# Patient Record
Sex: MALE | Race: WHITE | Employment: FULL TIME | URBAN - METROPOLITAN AREA
[De-identification: names, ages, dates, MRNs, and addresses within clinical notes are randomized per-mention and may not be internally consistent; named-entity substitution may affect disease eponyms.]

---

## 2017-09-11 ENCOUNTER — HOSPITAL ENCOUNTER (OUTPATIENT)
Dept: LAB | Age: 57
Discharge: HOME | End: 2017-09-11

## 2017-09-11 ENCOUNTER — HOSPITAL ENCOUNTER (EMERGENCY)
Age: 57
Discharge: HOME | End: 2017-09-12
Attending: EMERGENCY MEDICINE

## 2017-09-11 VITALS
SYSTOLIC BLOOD PRESSURE: 141 MMHG | DIASTOLIC BLOOD PRESSURE: 73 MMHG | OXYGEN SATURATION: 100 % | RESPIRATION RATE: 20 BRPM | HEART RATE: 85 BPM | BODY MASS INDEX: 30.78 KG/M2 | WEIGHT: 215 LBS | HEIGHT: 70 IN | TEMPERATURE: 97.7 F

## 2017-09-11 DIAGNOSIS — S39.012A LUMBAR STRAIN, INITIAL ENCOUNTER: Primary | ICD-10-CM

## 2017-09-11 DIAGNOSIS — M54.10 RADICULOPATHY OF LEG: ICD-10-CM

## 2017-09-11 LAB
ANION GAP BLD CALC-SCNC: 17 MMOL/L (ref 10–20)
BUN BLD-MCNC: 32 MG/DL (ref 7–18)
CA-I BLD-MCNC: 1.15 MMOL/L (ref 1.12–1.32)
CHLORIDE BLD-SCNC: 101 MMOL/L (ref 100–108)
CO2 BLD-SCNC: 25 MMOL/L (ref 19–24)
CREAT UR-MCNC: 1.2 MG/DL (ref 0.6–1.3)
GLUCOSE BLD STRIP.AUTO-MCNC: 249 MG/DL (ref 74–106)
HCT VFR BLD CALC: 42 % (ref 36–49)
HGB BLD-MCNC: 14.3 G/DL (ref 12–16)
POTASSIUM BLD-SCNC: 4.2 MMOL/L (ref 3.5–5.5)
SODIUM BLD-SCNC: 137 MMOL/L (ref 136–145)

## 2017-09-11 PROCEDURE — 99001 SPECIMEN HANDLING PT-LAB: CPT

## 2017-09-11 PROCEDURE — 80047 BASIC METABLC PNL IONIZED CA: CPT

## 2017-09-11 PROCEDURE — 99283 EMERGENCY DEPT VISIT LOW MDM: CPT

## 2017-09-11 RX ORDER — SIMVASTATIN 20 MG/1
TABLET, FILM COATED ORAL
COMMUNITY

## 2017-09-11 RX ORDER — IBUPROFEN 600 MG/1
600 TABLET ORAL
Qty: 20 TAB | Refills: 0 | Status: SHIPPED | OUTPATIENT
Start: 2017-09-11 | End: 2021-03-18

## 2017-09-11 RX ORDER — CYCLOBENZAPRINE HCL 5 MG
5 TABLET ORAL
Qty: 15 TAB | Refills: 0 | Status: SHIPPED | OUTPATIENT
Start: 2017-09-11

## 2017-09-11 RX ORDER — LEVOTHYROXINE SODIUM 50 UG/1
TABLET ORAL
COMMUNITY

## 2017-09-11 RX ORDER — INSULIN LISPRO 100 [IU]/ML
INJECTION, SOLUTION INTRAVENOUS; SUBCUTANEOUS
Status: ON HOLD | COMMUNITY
End: 2021-03-15

## 2017-09-11 RX ORDER — LISINOPRIL 5 MG/1
5 TABLET ORAL DAILY
COMMUNITY

## 2017-09-11 NOTE — LETTER
[facility] [place] 
THE [facility] EMERGENCY DEPT 
Keerthi Amor [address] 
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 Lumbar strain, initial encounter S39.012A 847.2 2. Radiculopathy of leg M54.10 724.4 Instructions:  Employee must return copy of this report to Personnel and/or Supervisor. Patient Identification - Company Protocol:   
   Checked & Followed   Not Available PART I:  Check Treatment X-ray     Lab    Discharge Instructions Given    Rx Given Non-Prescription Meds    Sutures       EKG Other (Comment):   
 
Part II:  Disposition May Return to Regular Work Without Restrictions May Return to Restricted Duty as Below Explanation of RESTRICTIONS (Comment): May NOT Return to Work until cleared by Referral Specialist or Occupational Medicine MD 
 
Part III:  Follow-Up Follow-up Information Follow up With Details Comments Contact Info Destiny Herrera MD Call in 2 days For primary care physician follow up   [address] [facility] [address] [phone] [facility] EMERGENCY DEPT Go to As needed, If symptoms worsen [address] [place] [address] [phone] Part IV: Was Workers Comp Supervisor Notified     Yes     No 
 
Damian Ventura was seen in the Emergency Department on 9/11/2017 by the following provider(s): 
Attending Provider: Saji Servin MD 
Physician Assistant: ANETA Carey; ED Nurse: PLEASE CALL CASE FACILITATOR, OCCUPATIONAL MEDICINE  
[phone] TO SCHEDULE AN APPOINTMENT Referral Physicians: Maria Esther Khan MD 
[id number] [facility] [address] [facility]. [place], [address] [address] [facility], [address] Phone:  [phone]; Fax:  [phone] ORTHOPEDICS [address] [facility] [address] [place] [address]., Suite [facility]., [address] [place], [place] [address]    [address] 
[phone] [facility] [address] [address] [place] [facility] [address] 
[phone]

## 2017-09-11 NOTE — LETTER
[facility] [place] 
THE [facility] EMERGENCY DEPT 
Keerthi Amor [id number] [phone] Anurag Arredondo Date: 9/12/2017 Sex: male [address] Matos [address] 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 Lumbar strain, initial encounter S39.012A 847.2 2. Radiculopathy of leg M54.10 724.4 Instructions:  Employee must return copy of this report to Personnel and/or Supervisor. Patient Identification - Company Protocol:   
   Checked & Followed   Not Available PART I:  Check Treatment X-ray     Lab    Discharge Instructions Given    Rx Given Non-Prescription Meds    Sutures       EKG Other (Comment):   
 
Part II:  Disposition May Return to Regular Work Without Restrictions May Return to Restricted Duty as Below Explanation of RESTRICTIONS (Comment): May NOT Return to Work until cleared by Referral Specialist or Occupational Medicine MD 
 
Part III:  Follow-Up Follow-up Information Follow up With Details Comments Contact Info Chris Fernández MD Call in 2 days For primary care physician follow up  [address] [place] [address] 
[phone] [facility] EMERGENCY DEPT Go to As needed, If symptoms worsen [address] [place] [address] 
[phone] Occupational Medicine [facility] family Practice Call in 1 day For occupational medicine follow up [address] [place] [address] 
[phone] Part IV: Was Workers Comp Supervisor Notified     Yes     No 
 
Anurag Arredondo was seen in the Emergency Department on 9/11/2017 by the following provider(s): 
Attending Provider: Al Scott MD 
Physician Assistant: ANETA English; ED Nurse: PLEASE CALL CASE FACILITATOR, OCCUPATIONAL MEDICINE  
 [phone] TO SCHEDULE AN APPOINTMENT Referral Physicians: Mary Hooks MD 
[id number] [facility]. [address] [facility]. [place], [address] [address] [facility], [address] Phone:  [phone]; Fax:  [phone] ORTHOPEDICS [address] [facility] [address] [place] [address]., Suite [facility]., [address] [place], [place] [address]em[address] Leone [address]    [address] 
[phone] [facility] [address] [address] 
[phone]

## 2017-09-11 NOTE — LETTER
[facility] [place] 
THE [facility] EMERGENCY DEPT 
[address] [id number] [phone] Work/School Note Date: 9/11/2017 To Whom It May concern: 
 
Damian Ventura was seen and treated today in the emergency room by the following provider(s): 
Attending Provider: Saji Servin MD 
Physician Assistant: ANTEA Carey. Damian Ventura may return to work after cleared by Occupational Medicine follow up. Sincerely, Jing Cobb PA-C

## 2017-09-12 NOTE — ED TRIAGE NOTES
Patient states that he injured his back while at work last night Patient with complaints of pain to the left side that radiates to the left thigh.

## 2017-09-12 NOTE — DISCHARGE INSTRUCTIONS
Back Pain: Care Instructions  Your Care Instructions    Back pain has many possible causes. It is often related to problems with muscles and ligaments of the back. It may also be related to problems with the nerves, discs, or bones of the back. Moving, lifting, standing, sitting, or sleeping in an awkward way can strain the back. Sometimes you don't notice the injury until later. Arthritis is another common cause of back pain. Although it may hurt a lot, back pain usually improves on its own within several weeks. Most people recover in 12 weeks or less. Using good home treatment and being careful not to stress your back can help you feel better sooner. Follow-up care is a key part of your treatment and safety. Be sure to make and go to all appointments, and call your doctor if you are having problems. Its also a good idea to know your test results and keep a list of the medicines you take. How can you care for yourself at home? · Sit or lie in positions that are most comfortable and reduce your pain. Try one of these positions when you lie down:  ¨ Lie on your back with your knees bent and supported by large pillows. ¨ Lie on the floor with your legs on the seat of a sofa or chair. Karn Miser on your side with your knees and hips bent and a pillow between your legs. ¨ Lie on your stomach if it does not make pain worse. · Do not sit up in bed, and avoid soft couches and twisted positions. Bed rest can help relieve pain at first, but it delays healing. Avoid bed rest after the first day of back pain. · Change positions every 30 minutes. If you must sit for long periods of time, take breaks from sitting. Get up and walk around, or lie in a comfortable position. · Try using a heating pad on a low or medium setting for 15 to 20 minutes every 2 or 3 hours. Try a warm shower in place of one session with the heating pad. · You can also try an ice pack for 10 to 15 minutes every 2 to 3 hours.  Put a thin cloth between the ice pack and your skin. · Take pain medicines exactly as directed. ¨ If the doctor gave you a prescription medicine for pain, take it as prescribed. ¨ If you are not taking a prescription pain medicine, ask your doctor if you can take an over-the-counter medicine. · Take short walks several times a day. You can start with 5 to 10 minutes, 3 or 4 times a day, and work up to longer walks. Walk on level surfaces and avoid hills and stairs until your back is better. · Return to work and other activities as soon as you can. Continued rest without activity is usually not good for your back. · To prevent future back pain, do exercises to stretch and strengthen your back and stomach. Learn how to use good posture, safe lifting techniques, and proper body mechanics. When should you call for help? Call your doctor now or seek immediate medical care if:  · You have new or worsening numbness in your legs. · You have new or worsening weakness in your legs. (This could make it hard to stand up.)  · You lose control of your bladder or bowels. Watch closely for changes in your health, and be sure to contact your doctor if:  · Your pain gets worse. · You are not getting better after 2 weeks. Where can you learn more? Go to http://eden-marcela.info/. Enter Q536 in the search box to learn more about \"Back Pain: Care Instructions. \"  Current as of: March 21, 2017  Content Version: 11.3  © 7067-2219 [employer]. Care instructions adapted under license by [employer] (which disclaims liability or warranty for this information). If you have questions about a medical condition or this instruction, always ask your healthcare professional. [address] any warranty or liability for your use of this information. Back Strain: Care Instructions  Your Care Instructions    Back strain happens when you overstretch, or pull, a muscle in your back.  You may hurt your back in an accident or when you exercise or lift something. Most back pain will get better with rest and time. You can take care of yourself at home to help your back heal.  Follow-up care is a key part of your treatment and safety. Be sure to make and go to all appointments, and call your doctor if you are having problems. It's also a good idea to know your test results and keep a list of the medicines you take. How can you care for yourself at home? · Try to stay as active as you can, but stop or reduce any activity that causes pain. · Put ice or a cold pack on the sore muscle for 10 to 20 minutes at a time to stop swelling. Try this every 1 to 2 hours for 3 days (when you are awake) or until the swelling goes down. Put a thin cloth between the ice pack and your skin. · After 2 or 3 days, apply a heating pad on low or a warm cloth to your back. Some doctors suggest that you go back and forth between hot and cold treatments. · Take pain medicines exactly as directed. ¨ If the doctor gave you a prescription medicine for pain, take it as prescribed. ¨ If you are not taking a prescription pain medicine, ask your doctor if you can take an over-the-counter medicine. · Try sleeping on your side with a pillow between your legs. Or put a pillow under your knees when you lie on your back. These measures can ease pain in your lower back. · Return to your usual level of activity slowly. When should you call for help? Call 911 anytime you think you may need emergency care. For example, call if:  · You are unable to move a leg at all. Call your doctor now or seek immediate medical care if:  · You have new or worse symptoms in your legs, belly, or buttocks. Symptoms may include:  ¨ Numbness or tingling. ¨ Weakness. ¨ Pain. · You lose bladder or bowel control. Watch closely for changes in your health, and be sure to contact your doctor if you are not getting better as expected.   Where can you learn more?  Go to http://eden-marcela.info/. Enter P737 in the search box to learn more about \"Back Strain: Care Instructions. \"  Current as of: March 21, 2017  Content Version: 11.3  © 0940-9590 [employer]. Care instructions adapted under license by [employer] (which disclaims liability or warranty for this information). If you have questions about a medical condition or this instruction, always ask your healthcare professional. [address] any warranty or liability for your use of this information.

## 2017-09-12 NOTE — ED NOTES
I have reviewed discharge instructions with the patient. The patient verbalized understanding. Patient armband removed and shredded. patient given 2 prescriptions. Discussed side effects with patient. patient verbalized understanding. Patient wheeled to lobby by this RN with female vistor. Patient discharged in stable condition to care of self. Given 1 work note and workmen's comp paperwork.

## 2017-09-12 NOTE — ED PROVIDER NOTES
HPI Comments:   10:59 PM    Pearl Boas is a 65 y.o. male PMHx DM presents to ED C/O constant left sided back pain, onset yesterday night. 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 Associated symptoms include pain shooting down left leg when he moves, and states some mild leg weakness. Pt has not tried anything for the pain. Pt reports good kidney function. Pt denies tingling in legs, incontinence, Hx of back injuries and any other Sx or complaints. The history is provided by the patient. No  was used. Past Medical History:   Diagnosis Date    Diabetes ([facility] [address]     Hashimoto's disease     High cholesterol     Hypertension        Past Surgical History:   Procedure Laterality Date    HX KNEE ARTHROSCOPY           History reviewed. No pertinent family history. Social History     Social History    Marital status: SINGLE     Spouse name: N/A    Number of children: N/A    Years of education: N/A     Occupational History    Not on file. Social History Main Topics    Smoking status: Never Smoker    Smokeless tobacco: Never Used    Alcohol use No    Drug use: Not on file    Sexual activity: Not on file     Other Topics Concern    Not on file     Social History Narrative    No narrative on file         ALLERGIES: Glipizide and Metformin    Review of Systems   Constitutional: Negative for chills and fever. Musculoskeletal: Positive for back pain. (+) LLE pain     Neurological: Positive for weakness (mild weakness in legs). All other systems reviewed and are negative. Vitals:    09/11/17 2228   BP: 141/73   Pulse: 85   Resp: 20   Temp: 97.7 °F (36.5 °C)   SpO2: 100%   Weight: 97.5 kg (215 lb)   Height: 5' 10\" (1.778 m)            Physical Exam   Constitutional: He is oriented to person, place, and time. He appears well-developed and well-nourished.    Able to ambulate in ED, non toxic, NAD    HENT:   Head: Normocephalic and atraumatic. Neck: Normal range of motion. Neck supple. Cardiovascular: Normal rate, regular rhythm, normal heart sounds and intact distal pulses. No murmur heard. Pulmonary/Chest: Effort normal and breath sounds normal. No respiratory distress. He has no wheezes. He has no rales. Musculoskeletal:        Lumbar back: He exhibits tenderness. He exhibits normal range of motion, no bony tenderness, no swelling, no edema, no deformity and no spasm. Back:    BLE, strength 5/5, N/V intact, brisk cap refill, pulses 2+ for DP and PT     Neurological: He is alert and oriented to person, place, and time. He has normal strength. No sensory deficit. Reflex Scores:       Patellar reflexes are 2+ on the right side and 2+ on the left side. Skin: Skin is warm and dry. Psychiatric: He has a normal mood and affect. Judgment normal.   Nursing note and vitals reviewed.     RESULTS:    PULSE OXIMETRY NOTE:  Pulse-ox is 100% on room air  Interpretation: normal       No orders to display        Labs Reviewed   POC CHEM8 - Abnormal; Notable for the following:        Result Value    CO2, POC 25 (*)     Glucose,  (*)     BUN, POC 32 (*)     All other components within normal limits   POC CHEM8       Recent Results (from the past 12 hour(s))   POC CHEM8    Collection Time: 09/11/17 11:36 PM   Result Value Ref Range    CO2, POC 25 (H) 19 - 24 MMOL/L    Glucose,  (H) 74 - 106 MG/DL    BUN, POC 32 (H) 7 - 18 MG/DL    Creatinine, POC 1.2 0.6 - 1.3 MG/DL    GFRAA, POC >60 >60 ml/min/1.73m2    GFRNA, POC >60 >60 ml/min/1.73m2    Sodium,  136 - 145 MMOL/L    Potassium, POC 4.2 3.5 - 5.5 MMOL/L    Calcium, ionized (POC) 1.15 1.12 - 1.32 MMOL/L    Chloride,  100 - 108 MMOL/L    Anion gap, POC 17 10 - 20      Hematocrit, POC 42 36 - 49 %    Hemoglobin, POC 14.3 12 - 16 G/DL         MDM  Number of Diagnoses or Management Options  Lumbar strain, initial encounter:   Radiculopathy of leg:   Diagnosis management comments: Back pain is consistent with lumbar strain. Could be disc involvement / herniation. Doubt fracture, cauda equina syndrome, AAA, metastases, pathological fracture, Zoster or other serious pathology. No neurological deficits. Amount and/or Complexity of Data Reviewed  Clinical lab tests: ordered and reviewed      ED Course     Medications - No data to display     Procedures    PROGRESS NOTE:  10:59 PM  Initial assessment performed. Written by Leny Lujan, ED Scribe, as dictated by Santhosh Dunham PA-C     PROGRESS NOTE:   11:46 PM  Pt has been re-examined by Santhosh Dunham PA-C. Pt creatinine was 1.2 and BUN was 32. Will give low dose ibuprofen for inflammation and flexeril     DISCHARGE NOTE:  11:50 PM  Edith Miller's  results have been reviewed with him. He has been counseled regarding his diagnosis, treatment, and plan. He verbally conveys understanding and agreement of the signs, symptoms, diagnosis, treatment and prognosis and additionally agrees to follow up as discussed. He also agrees with the care-plan and conveys that all of his questions have been answered. I have also provided discharge instructions for him that include: educational information regarding their diagnosis and treatment, and list of reasons why they would want to return to the ED prior to their follow-up appointment, should his condition change. Proper ED utilization discussed with the pt. CLINICAL IMPRESSION:    1. Lumbar strain, initial encounter    2.  Radiculopathy of leg        PLAN: DISCHARGE HOME    Follow-up Information     Follow up With Details Comments Laz Bourgeois MD Call in 2 days For primary care physician follow up  [address] [place] [address],[address]      THE [facility] EMERGENCY DEPT Go to As needed, If symptoms worsen [address] [address] [phone] Occupational Medicine & family Practice Call in 1 day For occupational medicine follow up [address] [facility], [address]  [phone]          Discharge Medication List as of 9/12/2017 12:03 AM          ATTESTATIONS:  This note is prepared by Poppy Aguilar, acting as Scribe for Daisy Ruiz PA-C. Daisy Ruiz PA-C: The scribe's documentation has been prepared under my direction and personally reviewed by me in its entirety. I confirm that the note above accurately reflects all work, treatment, procedures, and medical decision making performed by me.

## 2021-03-07 ENCOUNTER — HOSPITAL ENCOUNTER (EMERGENCY)
Age: 61
Discharge: HOME | End: 2021-03-08
Attending: EMERGENCY MEDICINE
Payer: COMMERCIAL

## 2021-03-07 DIAGNOSIS — Z20.822 PERSON UNDER INVESTIGATION FOR COVID-19: Primary | ICD-10-CM

## 2021-03-07 LAB
LACTATE BLD-SCNC: 0.96 MMOL/L (ref 0.4–2)
SARS-COV-2, COV2: NORMAL

## 2021-03-07 PROCEDURE — 87804 INFLUENZA ASSAY W/OPTIC: CPT

## 2021-03-07 PROCEDURE — 83605 ASSAY OF LACTIC ACID: CPT

## 2021-03-07 PROCEDURE — 80053 COMPREHEN METABOLIC PANEL: CPT

## 2021-03-07 PROCEDURE — 85025 COMPLETE CBC W/AUTO DIFF WBC: CPT

## 2021-03-07 PROCEDURE — 99284 EMERGENCY DEPT VISIT MOD MDM: CPT

## 2021-03-07 PROCEDURE — U0003 INFECTIOUS AGENT DETECTION BY NUCLEIC ACID (DNA OR RNA); SEVERE ACUTE RESPIRATORY SYNDROME CORONAVIRUS 2 (SARS-COV-2) (CORONAVIRUS DISEASE [COVID-19]), AMPLIFIED PROBE TECHNIQUE, MAKING USE OF HIGH THROUGHPUT TECHNOLOGIES AS DESCRIBED BY CMS-2020-01-R: HCPCS

## 2021-03-07 RX ORDER — IBUPROFEN 400 MG/1
800 TABLET ORAL
Status: COMPLETED | OUTPATIENT
Start: 2021-03-07 | End: 2021-03-08

## 2021-03-07 NOTE — Clinical Note
[facility] [place] 
THE [facility] EMERGENCY DEPT 
[address] [address] [phone] Work/School Note Date: 3/7/2021 To Whom It May concern: 
 
Masood Colno was evaulated by the following provider(s): 
Attending Provider: Gab Olson MD 
Physician Assistant: Archie Mireles, [address] virus is suspected. Per the CDC guidelines we recommend home isolation until the following conditions are all met: 1. At least 10 days have passed since symptoms first appeared and 2. At least 24 hours have passed since last fever without the use of fever-reducing medications and 
3. Symptoms (e.g., cough, shortness of breath) have improved Sincerely, ANETA Monterroso

## 2021-03-08 ENCOUNTER — APPOINTMENT (OUTPATIENT)
Dept: GENERAL RADIOLOGY | Age: 61
End: 2021-03-08
Attending: PHYSICIAN ASSISTANT
Payer: COMMERCIAL

## 2021-03-08 VITALS
HEIGHT: 69 IN | WEIGHT: 212 LBS | SYSTOLIC BLOOD PRESSURE: 126 MMHG | TEMPERATURE: 99.6 F | DIASTOLIC BLOOD PRESSURE: 69 MMHG | RESPIRATION RATE: 27 BRPM | BODY MASS INDEX: 31.4 KG/M2 | OXYGEN SATURATION: 94 % | HEART RATE: 75 BPM

## 2021-03-08 LAB
ALBUMIN SERPL-MCNC: 3.8 G/DL (ref 3.4–5)
ALBUMIN/GLOB SERPL: 1.2 {RATIO} (ref 0.8–1.7)
ALP SERPL-CCNC: 135 U/L (ref 45–117)
ALT SERPL-CCNC: 27 U/L (ref 16–61)
ANION GAP SERPL CALC-SCNC: 7 MMOL/L (ref 3–18)
AST SERPL-CCNC: 19 U/L (ref 10–38)
BASOPHILS # BLD: 0 K/UL (ref 0–0.1)
BASOPHILS NFR BLD: 0 % (ref 0–2)
BILIRUB SERPL-MCNC: 0.4 MG/DL (ref 0.2–1)
BUN SERPL-MCNC: 27 MG/DL (ref 7–18)
BUN/CREAT SERPL: 20 (ref 12–20)
CALCIUM SERPL-MCNC: 9.1 MG/DL (ref 8.5–10.1)
CHLORIDE SERPL-SCNC: 101 MMOL/L (ref 100–111)
CO2 SERPL-SCNC: 25 MMOL/L (ref 21–32)
CREAT SERPL-MCNC: 1.35 MG/DL (ref 0.6–1.3)
DIFFERENTIAL METHOD BLD: ABNORMAL
EOSINOPHIL # BLD: 0 K/UL (ref 0–0.4)
EOSINOPHIL NFR BLD: 1 % (ref 0–5)
ERYTHROCYTE [DISTWIDTH] IN BLOOD BY AUTOMATED COUNT: 11.2 % (ref 11.6–14.5)
FLUAV AG NPH QL IA: NEGATIVE
FLUBV AG NOSE QL IA: NEGATIVE
GLOBULIN SER CALC-MCNC: 3.2 G/DL (ref 2–4)
GLUCOSE SERPL-MCNC: 317 MG/DL (ref 74–99)
HCT VFR BLD AUTO: 40 % (ref 36–48)
HGB BLD-MCNC: 14.1 G/DL (ref 13–16)
LYMPHOCYTES # BLD: 0.6 K/UL (ref 0.9–3.6)
LYMPHOCYTES NFR BLD: 8 % (ref 21–52)
MCH RBC QN AUTO: 33 PG (ref 24–34)
MCHC RBC AUTO-ENTMCNC: 35.3 G/DL (ref 31–37)
MCV RBC AUTO: 93.7 FL (ref 74–97)
MONOCYTES # BLD: 0.4 K/UL (ref 0.05–1.2)
MONOCYTES NFR BLD: 6 % (ref 3–10)
NEUTS SEG # BLD: 5.6 K/UL (ref 1.8–8)
NEUTS SEG NFR BLD: 85 % (ref 40–73)
PLATELET # BLD AUTO: 193 K/UL (ref 135–420)
PMV BLD AUTO: 10.5 FL (ref 9.2–11.8)
POTASSIUM SERPL-SCNC: 4.1 MMOL/L (ref 3.5–5.5)
PROT SERPL-MCNC: 7 G/DL (ref 6.4–8.2)
RBC # BLD AUTO: 4.27 M/UL (ref 4.7–5.5)
SODIUM SERPL-SCNC: 133 MMOL/L (ref 136–145)
WBC # BLD AUTO: 6.7 K/UL (ref 4.6–13.2)

## 2021-03-08 PROCEDURE — 71045 X-RAY EXAM CHEST 1 VIEW: CPT

## 2021-03-08 PROCEDURE — 74011250637 HC RX REV CODE- 250/637: Performed by: PHYSICIAN ASSISTANT

## 2021-03-08 PROCEDURE — 74011250636 HC RX REV CODE- 250/636: Performed by: PHYSICIAN ASSISTANT

## 2021-03-08 RX ORDER — DOXYCYCLINE HYCLATE 100 MG
100 TABLET ORAL 2 TIMES DAILY
Qty: 14 TAB | Refills: 0 | Status: SHIPPED | OUTPATIENT
Start: 2021-03-08 | End: 2021-03-18

## 2021-03-08 RX ORDER — ALBUTEROL SULFATE 90 UG/1
2 AEROSOL, METERED RESPIRATORY (INHALATION)
Qty: 1 INHALER | Refills: 0 | Status: SHIPPED | OUTPATIENT
Start: 2021-03-08

## 2021-03-08 RX ADMIN — SODIUM CHLORIDE 1000 ML: 900 INJECTION, SOLUTION INTRAVENOUS at 00:46

## 2021-03-08 RX ADMIN — IBUPROFEN 800 MG: 400 TABLET, FILM COATED ORAL at 00:09

## 2021-03-08 NOTE — ED TRIAGE NOTES
Patient arrives ambulatory from home c/o sore throat and cold symptoms x2 days. Sore throat is gone but not reports headache, body aches, and weakness. Hx of diabetes. Unknown fevers. Works at [employer] so unknown [address].

## 2021-03-08 NOTE — ED PROVIDER NOTES
EMERGENCY DEPARTMENT HISTORY AND PHYSICAL EXAM    Date: 3/7/2021  Patient Name: Chrissie Rogers    History of Presenting Illness     Chief Complaint   Patient presents with    Concern For DWPEO-59 (Coronavirus)         History Provided By: Patient    Chief Complaint: flu like sxs       Additional History (Context):   11:26 PM  Chrissie Rogers is a 62 y.o. male with PMHX diabetes presents to the emergency department C/O congestion sore throat that began last week which is now resolved now with a slightly productive cough. No chest pain or shortness of breath. States has been feeling chills and body aches with a slight headache. States he was unaware of fever at home until he arrived in the ED. He denies any known exposure to Covid but states he works at [employer] and could have been exposed. PCP: Herbert Gill MD    Current Facility-Administered Medications   Medication Dose Route Frequency Provider Last Rate Last Admin    sodium chloride 0.9 % bolus infusion 1,000 mL  1,000 mL IntraVENous ONCE ANETA [place] 1,000 mL/hr at 03/08/21 0046 1,000 mL at 03/08/21 0046     Current Outpatient Medications   Medication Sig Dispense Refill    albuterol (PROVENTIL HFA, VENTOLIN HFA, PROAIR HFA) 90 mcg/actuation inhaler Take 2 Puffs by inhalation every four (4) hours as needed for Wheezing. 1 Inhaler 0    doxycycline (VIBRA-TABS) 100 mg tablet Take 1 Tab by mouth two (2) times a day for 7 days. 14 Tab 0    insulin detemir (LEVEMIR) 100 unit/mL injection by SubCUTAneous route nightly.  insulin lispro (HUMALOG) 100 unit/mL injection by SubCUTAneous route.  levothyroxine (SYNTHROID) 50 mcg tablet Take  by mouth Daily (before breakfast).  lisinopril (PRINIVIL, ZESTRIL) 5 mg tablet Take 5 mg by mouth daily.  simvastatin (ZOCOR) 20 mg tablet Take  by mouth nightly.  ibuprofen (MOTRIN) 600 mg tablet Take 1 Tab by mouth every six (6) hours as needed for Pain.  20 Tab 0    cyclobenzaprine (FLEXERIL) 5 mg tablet Take 1 Tab by mouth three (3) times daily as needed for Muscle Spasm(s). 15 Tab 0       Past History     Past Medical History:  Past Medical History:   Diagnosis Date    Diabetes ([address]     Hashimoto's disease     High cholesterol     Hypertension        Past Surgical History:  Past Surgical History:   Procedure Laterality Date    HX KNEE ARTHROSCOPY         Family History:  History reviewed. No pertinent family history. Social History:  Social History     Tobacco Use    Smoking status: Never Smoker    Smokeless tobacco: Never Used   Substance Use Topics    Alcohol use: No    Drug use: Not on file       Allergies: Allergies   Allergen Reactions    Glipizide Other (comments)     Kidney problems      Metformin Other (comments)     Kidney problems           Review of Systems   Review of Systems   Constitutional: Negative for chills and fever. HENT: Positive for congestion and sore throat. Respiratory: Positive for cough. Negative for shortness of breath. Cardiovascular: Negative for chest pain. Gastrointestinal: Negative for abdominal pain, diarrhea, nausea and vomiting. Musculoskeletal: Positive for myalgias. Neurological: Positive for headaches. Negative for weakness and numbness. All other systems reviewed and are negative. Physical Exam     Vitals:    03/07/21 2315 03/08/21 0000 03/08/21 0030 03/08/21 0045   BP: (!) 154/68 123/87 137/69 105/85   Pulse: 99  85 87   Resp: 16  (!) 32 25   Temp: (!) 101.6 °F (38.7 °C)   99.6 °F (37.6 °C)   SpO2: 98% 96% 95% 94%   Weight: 96.2 kg (212 lb)      Height: 5' 9\" (1.753 m)        Physical Exam  Vitals signs and nursing note reviewed. Constitutional:       General: He is not in acute distress. Appearance: He is well-developed. Comments: Alert, well appearing, non toxic, speaking in full sentences without difficulty    HENT:      Head: Normocephalic and atraumatic.       Right Ear: Tympanic membrane, ear canal and external ear normal. Tympanic membrane is not perforated, erythematous, retracted or bulging. Left Ear: Tympanic membrane, ear canal and external ear normal. Tympanic membrane is not perforated, erythematous, retracted or bulging. Nose: Nose normal. No mucosal edema or rhinorrhea. Right Sinus: No maxillary sinus tenderness or frontal sinus tenderness. Left Sinus: No maxillary sinus tenderness or frontal sinus tenderness. Mouth/Throat:      Mouth: Mucous membranes are not dry. Pharynx: Uvula midline. No oropharyngeal exudate, posterior oropharyngeal erythema or uvula swelling. Tonsils: No tonsillar abscesses. Neck:      Musculoskeletal: Normal range of motion and neck supple. Cardiovascular:      Rate and Rhythm: Normal rate and regular rhythm. Heart sounds: Normal heart sounds. No murmur. Pulmonary:      Effort: Pulmonary effort is normal. No respiratory distress. Breath sounds: Normal breath sounds. No wheezing or rales. Abdominal:      General: Bowel sounds are normal.      Palpations: Abdomen is soft. Tenderness: There is no abdominal tenderness. Lymphadenopathy:      Cervical: No cervical adenopathy. Skin:     General: Skin is warm and dry. Findings: No rash. Neurological:      Mental Status: He is alert and oriented to person, place, and time.    Psychiatric:         Judgment: Judgment normal.         Diagnostic Study Results     Labs:     Recent Results (from the past 12 hour(s))   INFLUENZA A & B AG (RAPID TEST)    Collection Time: 03/07/21 11:31 PM   Result Value Ref Range    Influenza A Antigen Negative NEG      Influenza B Antigen Negative NEG     SARS-COV-2    Collection Time: 03/07/21 11:31 PM   Result Value Ref Range    SARS-CoV-2 Please find results under separate order     POC LACTIC ACID    Collection Time: 03/07/21 11:32 PM   Result Value Ref Range    Lactic Acid (POC) 0.96 0.40 - 2.00 mmol/L   CBC WITH AUTOMATED DIFF    Collection Time: 03/07/21 11:32 PM   Result Value Ref Range    WBC 6.7 4.6 - 13.2 K/uL    RBC 4.27 (L) 4.70 - 5.50 M/uL    HGB 14.1 13.0 - 16.0 g/dL    HCT 40.0 36.0 - 48.0 %    MCV 93.7 74.0 - 97.0 FL    MCH 33.0 24.0 - 34.0 PG    MCHC 35.3 31.0 - 37.0 g/dL    RDW 11.2 (L) 11.6 - 14.5 %    PLATELET 250 702 - 609 K/uL    MPV 10.5 9.2 - 11.8 FL    NEUTROPHILS 85 (H) 40 - 73 %    LYMPHOCYTES 8 (L) 21 - 52 %    MONOCYTES 6 3 - 10 %    EOSINOPHILS 1 0 - 5 %    BASOPHILS 0 0 - 2 %    ABS. NEUTROPHILS 5.6 1.8 - 8.0 K/UL    ABS. LYMPHOCYTES 0.6 (L) 0.9 - 3.6 K/UL    ABS. MONOCYTES 0.4 0.05 - 1.2 K/UL    ABS. EOSINOPHILS 0.0 0.0 - 0.4 K/UL    ABS. BASOPHILS 0.0 0.0 - 0.1 K/UL    DF AUTOMATED     METABOLIC PANEL, COMPREHENSIVE    Collection Time: 03/07/21 11:32 PM   Result Value Ref Range    Sodium 133 (L) 136 - 145 mmol/L    Potassium 4.1 3.5 - 5.5 mmol/L    Chloride 101 100 - 111 mmol/L    CO2 25 21 - 32 mmol/L    Anion gap 7 3.0 - 18 mmol/L    Glucose 317 (H) 74 - 99 mg/dL    BUN 27 (H) 7.0 - 18 MG/DL    Creatinine 1.35 (H) 0.6 - 1.3 MG/DL    BUN/Creatinine ratio 20 12 - 20      GFR est AA >60 >60 ml/min/1.73m2    GFR est non-AA 54 (L) >60 ml/min/1.73m2    Calcium 9.1 8.5 - 10.1 MG/DL    Bilirubin, total 0.4 0.2 - 1.0 MG/DL    ALT (SGPT) 27 16 - 61 U/L    AST (SGOT) 19 10 - 38 U/L    Alk. phosphatase 135 (H) 45 - 117 U/L    Protein, total 7.0 6.4 - 8.2 g/dL    Albumin 3.8 3.4 - 5.0 g/dL    Globulin 3.2 2.0 - 4.0 g/dL    A-G Ratio 1.2 0.8 - 1.7         Radiologic Studies:   XR CHEST PORT    (Results Pending)     CT Results  (Last 48 hours)    None        CXR Results  (Last 48 hours)    None          Medical Decision Making   I am the first provider for this patient. I reviewed the vital signs, available nursing notes, past medical history, past surgical history, family history and social history. Vital Signs: Reviewed the patient's vital signs.     Pulse Oximetry Analysis: 94% on RA       Records Reviewed: Nursing Notes and Old Medical Records    Procedures:  Procedures    ED Course:   11:26 PM Initial assessment performed. The patients presenting problems have been discussed, and they are in agreement with the care plan formulated and outlined with them. I have encouraged them to ask questions as they arise throughout their visit. Discussion:  Pt presents with leg symptoms and a cough. Patient initially febrile. Concern for Covid. Rapid flu negative labs within normal limits with the exception of hyperglycemia which patient does have a history of diabetes. X-ray shows some changes that may represent early pneumonia or Covid, official radiology read pending. Will start on albuterol inhaler and cover with doxycycline given history of diabetes and have patient self isolate until Covid test results. Strict return precautions given, pt offering no questions or complaints. Diagnosis and Disposition     DISCHARGE NOTE:  Piero Peña  results have been reviewed with him. He has been counseled regarding his diagnosis, treatment, and plan. He verbally conveys understanding and agreement of the signs, symptoms, diagnosis, treatment and prognosis and additionally agrees to follow up as discussed. He also agrees with the care-plan and conveys that all of his questions have been answered. I have also provided discharge instructions for him that include: educational information regarding their diagnosis and treatment, and list of reasons why they would want to return to the ED prior to their follow-up appointment, should his condition change. He has been provided with education for proper emergency department utilization. CLINICAL IMPRESSION:    1. Person under investigation for COVID-19        PLAN:  1. D/C Home  2.    Current Discharge Medication List      START taking these medications    Details   albuterol (PROVENTIL HFA, VENTOLIN HFA, PROAIR HFA) 90 mcg/actuation inhaler Take 2 Puffs by inhalation every four (4) hours as needed for Wheezing. Qty: 1 Inhaler, Refills: 0      doxycycline (VIBRA-TABS) 100 mg tablet Take 1 Tab by mouth two (2) times a day for 7 days. Qty: 14 Tab, Refills: 0           3. Follow-up Information     Follow up With Specialties Details Why Contact Info    Nury Wolf MD Internal Medicine Schedule an appointment as soon as possible for a visit   [address] [place] [address],[address]      [facility] EMERGENCY DEPT Emergency Medicine  If symptoms worsen [address] [id number] [phone]                 Please note that this dictation was completed with [employer], the computer voice recognition software. Quite often unanticipated grammatical, syntax, homophones, and other interpretive errors are inadvertently transcribed by the computer software. Please disregard these errors. Please excuse any errors that have escaped final proofreading.

## 2021-03-09 ENCOUNTER — PATIENT OUTREACH (OUTPATIENT)
Dept: CASE MANAGEMENT | Age: 61
End: 2021-03-09

## 2021-03-09 LAB — SARS-COV-2, COV2NT: DETECTED

## 2021-03-09 NOTE — CALL BACK NOTE
Called mobile number on file. Reviewed positive Covid screening with patient. Understands diagnosis. Isolation quarantine and strict return precautions reviewed.

## 2021-03-09 NOTE — PROGRESS NOTES
Patient contacted regarding CMFXE-88 Suspect. Discussed COVID-19 related testing which was pending at this time. Test results were pending. Patient informed of results, if available? n/a. Outreach made within 2 business days of discharge: Yes    LPN Care Coordinator contacted the patient by telephone to perform post discharge assessment. Verified name and  with patient as identifiers. Provided introduction to self, and explanation of LPN Care Coordinator role, and reason for call due to risk factors for infection and/or exposure to COVID-19. Symptoms reviewed with patient who verbalized the following symptoms: no new symptoms, no worsening symptoms and Pt states he has lost of appetitie but no other symptoms . Denies SOB, chest pain, cough, chills, fever, body aches, wheezing, lightheadness/dizziness,HA, n/v/d at this time. Due to no new or worsening symptoms encounter was not routed to provider for escalation. Discussed follow-up appointments. If no appointment was previously scheduled, appointment scheduling offered: [facility] follow up appointment(s): No future appointments. Non-[facility] follow up appointment(s): n/a    Patient encouraged to make an appointment with PCP for f/up. Advised to return to ED if symptoms worsen or fail to improve. Patients acknowledges understanding. Advance Care Planning:   Does patient have an Advance Directive: currently not on file; education provided      Patient has following risk factors of: diabetes, HTN .     LPN reviewed discharge instructions, medical action plan and red flags such as increased shortness of breath, increasing fever and signs of decompensation with patient who verbalized understanding. Discussed exposure protocols and quarantine with CDC Guidelines What to do if you are sick with coronavirus disease .  Patient was given an opportunity for questions and concerns.  The patient agrees to contact the [facility] exposure line [phone], local [facility] [address]  ([phone]) and PCP office for questions related to their healthcare. LPN provided contact information for future needs. Reviewed and educated patient on any new and changed medications related to discharge diagnosis. Patient/family/caregiver given information for [employer] and agrees to enroll yes  Patient's preferred e-mail:  n/a  Patient's preferred phone number: [phone]   Based on Loop alert triggers, patient will be contacted by nurse care manager for worsening symptoms. Pt will be further monitored by COVID Loop Team based on severity of symptoms and risk factors.

## 2021-03-12 ENCOUNTER — HOSPITAL ENCOUNTER (INPATIENT)
Age: 61
LOS: 6 days | Discharge: HOME | DRG: 177 | End: 2021-03-18
Attending: EMERGENCY MEDICINE | Admitting: FAMILY MEDICINE
Payer: COMMERCIAL

## 2021-03-12 ENCOUNTER — APPOINTMENT (OUTPATIENT)
Dept: GENERAL RADIOLOGY | Age: 61
DRG: 177 | End: 2021-03-12
Attending: EMERGENCY MEDICINE
Payer: COMMERCIAL

## 2021-03-12 ENCOUNTER — PATIENT OUTREACH (OUTPATIENT)
Dept: CASE MANAGEMENT | Age: 61
End: 2021-03-12

## 2021-03-12 DIAGNOSIS — R09.02 HYPOXIA: ICD-10-CM

## 2021-03-12 DIAGNOSIS — U07.1 COVID-19: Primary | ICD-10-CM

## 2021-03-12 LAB
ALBUMIN SERPL-MCNC: 3.1 G/DL (ref 3.4–5)
ALBUMIN/GLOB SERPL: 0.9 {RATIO} (ref 0.8–1.7)
ALP SERPL-CCNC: 98 U/L (ref 45–117)
ALT SERPL-CCNC: 16 U/L (ref 16–61)
ANION GAP SERPL CALC-SCNC: 11 MMOL/L (ref 3–18)
APTT PPP: 43.4 SEC (ref 23–36.4)
ARTERIAL PATENCY WRIST A: ABNORMAL
AST SERPL-CCNC: 23 U/L (ref 10–38)
BASE DEFICIT BLD-SCNC: 1 MMOL/L
BASOPHILS # BLD: 0 K/UL (ref 0–0.1)
BASOPHILS NFR BLD: 0 % (ref 0–2)
BDY SITE: ABNORMAL
BILIRUB SERPL-MCNC: 0.5 MG/DL (ref 0.2–1)
BNP SERPL-MCNC: 85 PG/ML (ref 0–900)
BODY TEMPERATURE: 97.8
BUN SERPL-MCNC: 23 MG/DL (ref 7–18)
BUN/CREAT SERPL: 19 (ref 12–20)
CALCIUM SERPL-MCNC: 9.5 MG/DL (ref 8.5–10.1)
CHLORIDE SERPL-SCNC: 99 MMOL/L (ref 100–111)
CK MB CFR SERPL CALC: NORMAL % (ref 0–4)
CK MB SERPL-MCNC: <1 NG/ML (ref 5–25)
CK SERPL-CCNC: 42 U/L (ref 39–308)
CO2 SERPL-SCNC: 26 MMOL/L (ref 21–32)
CREAT SERPL-MCNC: 1.21 MG/DL (ref 0.6–1.3)
D DIMER PPP FEU-MCNC: 0.48 UG/ML(FEU)
DIFFERENTIAL METHOD BLD: ABNORMAL
EOSINOPHIL # BLD: 0 K/UL (ref 0–0.4)
EOSINOPHIL NFR BLD: 0 % (ref 0–5)
ERYTHROCYTE [DISTWIDTH] IN BLOOD BY AUTOMATED COUNT: 11.4 % (ref 11.6–14.5)
GAS FLOW.O2 O2 DELIVERY SYS: ABNORMAL L/MIN
GLOBULIN SER CALC-MCNC: 3.6 G/DL (ref 2–4)
GLUCOSE SERPL-MCNC: 328 MG/DL (ref 74–99)
HCO3 BLD-SCNC: 24.2 MMOL/L (ref 22–26)
HCT VFR BLD AUTO: 40.9 % (ref 36–48)
HGB BLD-MCNC: 14.2 G/DL (ref 13–16)
INR PPP: 1.1 (ref 0.8–1.2)
LACTATE BLD-SCNC: 1.86 MMOL/L (ref 0.4–2)
LIPASE SERPL-CCNC: 86 U/L (ref 73–393)
LYMPHOCYTES # BLD: 0.8 K/UL (ref 0.9–3.6)
LYMPHOCYTES NFR BLD: 12 % (ref 21–52)
MAGNESIUM SERPL-MCNC: 1.8 MG/DL (ref 1.6–2.6)
MCH RBC QN AUTO: 32.6 PG (ref 24–34)
MCHC RBC AUTO-ENTMCNC: 34.7 G/DL (ref 31–37)
MCV RBC AUTO: 93.8 FL (ref 74–97)
MONOCYTES # BLD: 0.3 K/UL (ref 0.05–1.2)
MONOCYTES NFR BLD: 4 % (ref 3–10)
NEUTS SEG # BLD: 5.4 K/UL (ref 1.8–8)
NEUTS SEG NFR BLD: 84 % (ref 40–73)
O2/TOTAL GAS SETTING VFR VENT: 0.21 %
PCO2 BLD: 39.4 MMHG (ref 35–45)
PH BLD: 7.4 [PH] (ref 7.35–7.45)
PLATELET # BLD AUTO: 221 K/UL (ref 135–420)
PMV BLD AUTO: 10.5 FL (ref 9.2–11.8)
PO2 BLD: 60 MMHG (ref 80–100)
POTASSIUM SERPL-SCNC: 4.1 MMOL/L (ref 3.5–5.5)
PROT SERPL-MCNC: 6.7 G/DL (ref 6.4–8.2)
PROTHROMBIN TIME: 14.5 SEC (ref 11.5–15.2)
RBC # BLD AUTO: 4.36 M/UL (ref 4.7–5.5)
SAO2 % BLD: 91 % (ref 92–97)
SERVICE CMNT-IMP: ABNORMAL
SODIUM SERPL-SCNC: 136 MMOL/L (ref 136–145)
SPECIMEN TYPE: ABNORMAL
TOTAL RESP. RATE, ITRR: 39
TROPONIN I SERPL-MCNC: <0.02 NG/ML (ref 0–0.04)
WBC # BLD AUTO: 6.4 K/UL (ref 4.6–13.2)

## 2021-03-12 PROCEDURE — 71045 X-RAY EXAM CHEST 1 VIEW: CPT

## 2021-03-12 PROCEDURE — 93005 ELECTROCARDIOGRAM TRACING: CPT

## 2021-03-12 PROCEDURE — 82803 BLOOD GASES ANY COMBINATION: CPT

## 2021-03-12 PROCEDURE — 83690 ASSAY OF LIPASE: CPT

## 2021-03-12 PROCEDURE — 86140 C-REACTIVE PROTEIN: CPT

## 2021-03-12 PROCEDURE — 83605 ASSAY OF LACTIC ACID: CPT

## 2021-03-12 PROCEDURE — 65660000000 HC RM CCU STEPDOWN

## 2021-03-12 PROCEDURE — 85610 PROTHROMBIN TIME: CPT

## 2021-03-12 PROCEDURE — 85379 FIBRIN DEGRADATION QUANT: CPT

## 2021-03-12 PROCEDURE — 83880 ASSAY OF NATRIURETIC PEPTIDE: CPT

## 2021-03-12 PROCEDURE — 74011250636 HC RX REV CODE- 250/636: Performed by: EMERGENCY MEDICINE

## 2021-03-12 PROCEDURE — 82553 CREATINE MB FRACTION: CPT

## 2021-03-12 PROCEDURE — 96374 THER/PROPH/DIAG INJ IV PUSH: CPT

## 2021-03-12 PROCEDURE — 36600 WITHDRAWAL OF ARTERIAL BLOOD: CPT

## 2021-03-12 PROCEDURE — 85730 THROMBOPLASTIN TIME PARTIAL: CPT

## 2021-03-12 PROCEDURE — 85025 COMPLETE CBC W/AUTO DIFF WBC: CPT

## 2021-03-12 PROCEDURE — 80053 COMPREHEN METABOLIC PANEL: CPT

## 2021-03-12 PROCEDURE — 99285 EMERGENCY DEPT VISIT HI MDM: CPT

## 2021-03-12 PROCEDURE — 96375 TX/PRO/DX INJ NEW DRUG ADDON: CPT

## 2021-03-12 PROCEDURE — 83735 ASSAY OF MAGNESIUM: CPT

## 2021-03-12 PROCEDURE — 87040 BLOOD CULTURE FOR BACTERIA: CPT

## 2021-03-12 RX ORDER — SODIUM CHLORIDE 0.9 % (FLUSH) 0.9 %
5-40 SYRINGE (ML) INJECTION AS NEEDED
Status: DISCONTINUED | OUTPATIENT
Start: 2021-03-12 | End: 2021-03-18 | Stop reason: HOSPADM

## 2021-03-12 RX ORDER — GUAIFENESIN/DEXTROMETHORPHAN 100-10MG/5
5 SYRUP ORAL
Status: DISCONTINUED | OUTPATIENT
Start: 2021-03-12 | End: 2021-03-18 | Stop reason: HOSPADM

## 2021-03-12 RX ORDER — MAGNESIUM SULFATE 100 %
16 CRYSTALS MISCELLANEOUS AS NEEDED
Status: DISCONTINUED | OUTPATIENT
Start: 2021-03-12 | End: 2021-03-18 | Stop reason: HOSPADM

## 2021-03-12 RX ORDER — SODIUM CHLORIDE 0.9 % (FLUSH) 0.9 %
5-10 SYRINGE (ML) INJECTION AS NEEDED
Status: DISCONTINUED | OUTPATIENT
Start: 2021-03-12 | End: 2021-03-18 | Stop reason: HOSPADM

## 2021-03-12 RX ORDER — INSULIN LISPRO 100 [IU]/ML
INJECTION, SOLUTION INTRAVENOUS; SUBCUTANEOUS
Status: DISCONTINUED | OUTPATIENT
Start: 2021-03-13 | End: 2021-03-18 | Stop reason: HOSPADM

## 2021-03-12 RX ORDER — DEXAMETHASONE 4 MG/1
6 TABLET ORAL DAILY
Status: DISCONTINUED | OUTPATIENT
Start: 2021-03-13 | End: 2021-03-18 | Stop reason: HOSPADM

## 2021-03-12 RX ORDER — PROMETHAZINE HYDROCHLORIDE 25 MG/1
12.5 TABLET ORAL
Status: DISCONTINUED | OUTPATIENT
Start: 2021-03-12 | End: 2021-03-18 | Stop reason: HOSPADM

## 2021-03-12 RX ORDER — ACETAMINOPHEN 325 MG/1
650 TABLET ORAL
Status: DISCONTINUED | OUTPATIENT
Start: 2021-03-12 | End: 2021-03-18 | Stop reason: HOSPADM

## 2021-03-12 RX ORDER — DEXAMETHASONE SODIUM PHOSPHATE 4 MG/ML
6 INJECTION, SOLUTION INTRA-ARTICULAR; INTRALESIONAL; INTRAMUSCULAR; INTRAVENOUS; SOFT TISSUE ONCE
Status: COMPLETED | OUTPATIENT
Start: 2021-03-12 | End: 2021-03-12

## 2021-03-12 RX ORDER — FAMOTIDINE 20 MG/1
20 TABLET, FILM COATED ORAL 2 TIMES DAILY
Status: DISCONTINUED | OUTPATIENT
Start: 2021-03-12 | End: 2021-03-12 | Stop reason: DRUGHIGH

## 2021-03-12 RX ORDER — ONDANSETRON 2 MG/ML
4 INJECTION INTRAMUSCULAR; INTRAVENOUS
Status: DISCONTINUED | OUTPATIENT
Start: 2021-03-12 | End: 2021-03-18 | Stop reason: HOSPADM

## 2021-03-12 RX ORDER — ENOXAPARIN SODIUM 100 MG/ML
40 INJECTION SUBCUTANEOUS EVERY 12 HOURS
Status: DISCONTINUED | OUTPATIENT
Start: 2021-03-12 | End: 2021-03-18 | Stop reason: HOSPADM

## 2021-03-12 RX ORDER — FAMOTIDINE 20 MG/1
20 TABLET, FILM COATED ORAL 2 TIMES DAILY
Status: DISCONTINUED | OUTPATIENT
Start: 2021-03-13 | End: 2021-03-18 | Stop reason: HOSPADM

## 2021-03-12 RX ORDER — ACETAMINOPHEN 650 MG/1
650 SUPPOSITORY RECTAL
Status: DISCONTINUED | OUTPATIENT
Start: 2021-03-12 | End: 2021-03-18 | Stop reason: HOSPADM

## 2021-03-12 RX ORDER — FAMOTIDINE 10 MG/ML
20 INJECTION INTRAVENOUS
Status: COMPLETED | OUTPATIENT
Start: 2021-03-12 | End: 2021-03-12

## 2021-03-12 RX ORDER — DEXTROSE MONOHYDRATE 100 MG/ML
125-250 INJECTION, SOLUTION INTRAVENOUS AS NEEDED
Status: DISCONTINUED | OUTPATIENT
Start: 2021-03-12 | End: 2021-03-18 | Stop reason: HOSPADM

## 2021-03-12 RX ORDER — ONDANSETRON 2 MG/ML
4 INJECTION INTRAMUSCULAR; INTRAVENOUS
Status: COMPLETED | OUTPATIENT
Start: 2021-03-12 | End: 2021-03-12

## 2021-03-12 RX ORDER — MELATONIN
2000 DAILY
Status: DISCONTINUED | OUTPATIENT
Start: 2021-03-13 | End: 2021-03-18 | Stop reason: HOSPADM

## 2021-03-12 RX ORDER — POLYETHYLENE GLYCOL 3350 17 G/17G
17 POWDER, FOR SOLUTION ORAL DAILY PRN
Status: DISCONTINUED | OUTPATIENT
Start: 2021-03-12 | End: 2021-03-18 | Stop reason: HOSPADM

## 2021-03-12 RX ORDER — SODIUM CHLORIDE 0.9 % (FLUSH) 0.9 %
5-40 SYRINGE (ML) INJECTION EVERY 8 HOURS
Status: DISCONTINUED | OUTPATIENT
Start: 2021-03-12 | End: 2021-03-18 | Stop reason: HOSPADM

## 2021-03-12 RX ADMIN — FAMOTIDINE 20 MG: 10 INJECTION INTRAVENOUS at 21:01

## 2021-03-12 RX ADMIN — ONDANSETRON 4 MG: 2 INJECTION INTRAMUSCULAR; INTRAVENOUS at 21:01

## 2021-03-12 RX ADMIN — DEXAMETHASONE SODIUM PHOSPHATE 6 MG: 4 INJECTION, SOLUTION INTRAMUSCULAR; INTRAVENOUS at 21:01

## 2021-03-12 NOTE — PROGRESS NOTES
Date/Time: 3/12/2021 10:35 AM    E-mail sent via LOOP to encourage activation of account enrollment. E-mail contains contact information for assistance with questions and help with enrollment as necessary.

## 2021-03-13 PROBLEM — U07.1 PNEUMONIA DUE TO COVID-19 VIRUS: Status: ACTIVE | Noted: 2021-03-13

## 2021-03-13 PROBLEM — J12.82 PNEUMONIA DUE TO COVID-19 VIRUS: Status: ACTIVE | Noted: 2021-03-13

## 2021-03-13 PROBLEM — E66.9 OBESITY (BMI 30-39.9): Status: ACTIVE | Noted: 2021-03-13

## 2021-03-13 PROBLEM — E11.9 TYPE 2 DIABETES MELLITUS (HCC): Status: ACTIVE | Noted: 2021-03-13

## 2021-03-13 PROBLEM — I10 HYPERTENSION: Status: ACTIVE | Noted: 2021-03-13

## 2021-03-13 LAB
ABO + RH BLD: NORMAL
ALBUMIN SERPL-MCNC: 3 G/DL (ref 3.4–5)
ALBUMIN/GLOB SERPL: 0.9 {RATIO} (ref 0.8–1.7)
ALP SERPL-CCNC: 96 U/L (ref 45–117)
ALT SERPL-CCNC: 19 U/L (ref 16–61)
ANION GAP SERPL CALC-SCNC: 11 MMOL/L (ref 3–18)
AST SERPL-CCNC: 21 U/L (ref 10–38)
ATRIAL RATE: 78 BPM
BILIRUB SERPL-MCNC: 0.5 MG/DL (ref 0.2–1)
BLOOD GROUP ANTIBODIES SERPL: NORMAL
BUN SERPL-MCNC: 32 MG/DL (ref 7–18)
BUN/CREAT SERPL: 23 (ref 12–20)
CALCIUM SERPL-MCNC: 9.1 MG/DL (ref 8.5–10.1)
CALCULATED P AXIS, ECG09: 55 DEGREES
CALCULATED R AXIS, ECG10: 56 DEGREES
CALCULATED T AXIS, ECG11: 66 DEGREES
CHLORIDE SERPL-SCNC: 96 MMOL/L (ref 100–111)
CO2 SERPL-SCNC: 25 MMOL/L (ref 21–32)
CREAT SERPL-MCNC: 1.39 MG/DL (ref 0.6–1.3)
CRP SERPL-MCNC: 12.4 MG/DL (ref 0–0.3)
CRP SERPL-MCNC: 13.1 MG/DL (ref 0–0.3)
D DIMER PPP FEU-MCNC: 0.44 UG/ML(FEU)
DIAGNOSIS, 93000: NORMAL
ERYTHROCYTE [DISTWIDTH] IN BLOOD BY AUTOMATED COUNT: 11.5 % (ref 11.6–14.5)
FIBRINOGEN PPP-MCNC: 869 MG/DL (ref 210–451)
GLOBULIN SER CALC-MCNC: 3.5 G/DL (ref 2–4)
GLUCOSE BLD STRIP.AUTO-MCNC: 388 MG/DL (ref 70–110)
GLUCOSE BLD STRIP.AUTO-MCNC: 454 MG/DL (ref 70–110)
GLUCOSE BLD STRIP.AUTO-MCNC: 467 MG/DL (ref 70–110)
GLUCOSE BLD STRIP.AUTO-MCNC: 508 MG/DL (ref 70–110)
GLUCOSE BLD STRIP.AUTO-MCNC: 566 MG/DL (ref 70–110)
GLUCOSE SERPL-MCNC: 420 MG/DL (ref 74–99)
HBA1C MFR BLD: 12 % (ref 4.2–5.6)
HCT VFR BLD AUTO: 39.5 % (ref 36–48)
HGB BLD-MCNC: 13.6 G/DL (ref 13–16)
MCH RBC QN AUTO: 32.4 PG (ref 24–34)
MCHC RBC AUTO-ENTMCNC: 34.4 G/DL (ref 31–37)
MCV RBC AUTO: 94 FL (ref 74–97)
P-R INTERVAL, ECG05: 152 MS
PLATELET # BLD AUTO: 210 K/UL (ref 135–420)
PMV BLD AUTO: 10.4 FL (ref 9.2–11.8)
POTASSIUM SERPL-SCNC: 4.5 MMOL/L (ref 3.5–5.5)
PROT SERPL-MCNC: 6.5 G/DL (ref 6.4–8.2)
Q-T INTERVAL, ECG07: 350 MS
QRS DURATION, ECG06: 86 MS
QTC CALCULATION (BEZET), ECG08: 399 MS
RBC # BLD AUTO: 4.2 M/UL (ref 4.7–5.5)
SODIUM SERPL-SCNC: 132 MMOL/L (ref 136–145)
SPECIMEN EXP DATE BLD: NORMAL
VENTRICULAR RATE, ECG03: 78 BPM
WBC # BLD AUTO: 4.3 K/UL (ref 4.6–13.2)

## 2021-03-13 PROCEDURE — 36415 COLL VENOUS BLD VENIPUNCTURE: CPT

## 2021-03-13 PROCEDURE — 65660000000 HC RM CCU STEPDOWN

## 2021-03-13 PROCEDURE — 85027 COMPLETE CBC AUTOMATED: CPT

## 2021-03-13 PROCEDURE — 74011250636 HC RX REV CODE- 250/636: Performed by: FAMILY MEDICINE

## 2021-03-13 PROCEDURE — 85384 FIBRINOGEN ACTIVITY: CPT

## 2021-03-13 PROCEDURE — 83036 HEMOGLOBIN GLYCOSYLATED A1C: CPT

## 2021-03-13 PROCEDURE — 74011000258 HC RX REV CODE- 258: Performed by: FAMILY MEDICINE

## 2021-03-13 PROCEDURE — 77010033678 HC OXYGEN DAILY

## 2021-03-13 PROCEDURE — 74011636637 HC RX REV CODE- 636/637: Performed by: INTERNAL MEDICINE

## 2021-03-13 PROCEDURE — 82962 GLUCOSE BLOOD TEST: CPT

## 2021-03-13 PROCEDURE — 86140 C-REACTIVE PROTEIN: CPT

## 2021-03-13 PROCEDURE — 74011636637 HC RX REV CODE- 636/637: Performed by: FAMILY MEDICINE

## 2021-03-13 PROCEDURE — 85379 FIBRIN DEGRADATION QUANT: CPT

## 2021-03-13 PROCEDURE — 74011000250 HC RX REV CODE- 250: Performed by: FAMILY MEDICINE

## 2021-03-13 PROCEDURE — 74011250637 HC RX REV CODE- 250/637: Performed by: FAMILY MEDICINE

## 2021-03-13 PROCEDURE — 80053 COMPREHEN METABOLIC PANEL: CPT

## 2021-03-13 PROCEDURE — 86901 BLOOD TYPING SEROLOGIC RH(D): CPT

## 2021-03-13 RX ORDER — SODIUM CHLORIDE 9 MG/ML
250 INJECTION, SOLUTION INTRAVENOUS AS NEEDED
Status: DISCONTINUED | OUTPATIENT
Start: 2021-03-13 | End: 2021-03-18 | Stop reason: HOSPADM

## 2021-03-13 RX ORDER — ATORVASTATIN CALCIUM 10 MG/1
10 TABLET, FILM COATED ORAL
Status: DISCONTINUED | OUTPATIENT
Start: 2021-03-13 | End: 2021-03-18 | Stop reason: HOSPADM

## 2021-03-13 RX ORDER — LEVOTHYROXINE SODIUM 50 UG/1
50 TABLET ORAL
Status: DISCONTINUED | OUTPATIENT
Start: 2021-03-13 | End: 2021-03-18 | Stop reason: HOSPADM

## 2021-03-13 RX ORDER — INSULIN GLARGINE 100 [IU]/ML
10 INJECTION, SOLUTION SUBCUTANEOUS 2 TIMES DAILY
Status: DISCONTINUED | OUTPATIENT
Start: 2021-03-13 | End: 2021-03-13

## 2021-03-13 RX ORDER — LISINOPRIL 5 MG/1
5 TABLET ORAL DAILY
Status: DISCONTINUED | OUTPATIENT
Start: 2021-03-13 | End: 2021-03-18 | Stop reason: HOSPADM

## 2021-03-13 RX ORDER — INSULIN GLARGINE 100 [IU]/ML
10 INJECTION, SOLUTION SUBCUTANEOUS 2 TIMES DAILY
Status: DISCONTINUED | OUTPATIENT
Start: 2021-03-13 | End: 2021-03-16

## 2021-03-13 RX ADMIN — ENOXAPARIN SODIUM 40 MG: 100 INJECTION SUBCUTANEOUS at 22:03

## 2021-03-13 RX ADMIN — INSULIN GLARGINE 10 UNITS: 100 INJECTION, SOLUTION SUBCUTANEOUS at 22:02

## 2021-03-13 RX ADMIN — Medication 10 ML: at 13:38

## 2021-03-13 RX ADMIN — INSULIN LISPRO 10 UNITS: 100 INJECTION, SOLUTION INTRAVENOUS; SUBCUTANEOUS at 22:01

## 2021-03-13 RX ADMIN — FAMOTIDINE 20 MG: 20 TABLET ORAL at 22:03

## 2021-03-13 RX ADMIN — Medication 10 ML: at 06:26

## 2021-03-13 RX ADMIN — FAMOTIDINE 20 MG: 20 TABLET ORAL at 09:23

## 2021-03-13 RX ADMIN — INSULIN LISPRO 15 UNITS: 100 INJECTION, SOLUTION INTRAVENOUS; SUBCUTANEOUS at 17:40

## 2021-03-13 RX ADMIN — ENOXAPARIN SODIUM 40 MG: 100 INJECTION SUBCUTANEOUS at 11:00

## 2021-03-13 RX ADMIN — ATORVASTATIN CALCIUM 10 MG: 10 TABLET, FILM COATED ORAL at 22:03

## 2021-03-13 RX ADMIN — Medication 10 ML: at 00:07

## 2021-03-13 RX ADMIN — LEVOTHYROXINE SODIUM 50 MCG: 0.05 TABLET ORAL at 09:23

## 2021-03-13 RX ADMIN — LISINOPRIL 5 MG: 5 TABLET ORAL at 09:23

## 2021-03-13 RX ADMIN — Medication 10 ML: at 22:01

## 2021-03-13 RX ADMIN — DEXAMETHASONE 6 MG: 4 TABLET ORAL at 09:23

## 2021-03-13 RX ADMIN — REMDESIVIR 200 MG: 100 INJECTION, POWDER, LYOPHILIZED, FOR SOLUTION INTRAVENOUS at 03:21

## 2021-03-13 RX ADMIN — ENOXAPARIN SODIUM 40 MG: 100 INJECTION SUBCUTANEOUS at 00:06

## 2021-03-13 RX ADMIN — INSULIN LISPRO 10 UNITS: 100 INJECTION, SOLUTION INTRAVENOUS; SUBCUTANEOUS at 09:41

## 2021-03-13 RX ADMIN — Medication 2000 UNITS: at 09:23

## 2021-03-13 RX ADMIN — INSULIN GLARGINE 10 UNITS: 100 INJECTION, SOLUTION SUBCUTANEOUS at 09:23

## 2021-03-13 RX ADMIN — HUMAN INSULIN 10 UNITS: 100 INJECTION, SOLUTION SUBCUTANEOUS at 09:24

## 2021-03-13 RX ADMIN — INSULIN LISPRO 15 UNITS: 100 INJECTION, SOLUTION INTRAVENOUS; SUBCUTANEOUS at 13:37

## 2021-03-13 NOTE — ED NOTES
Patient placed on 2L/min O2 via NC, ABG suggest hypoxia. Pt continues to report some dyspnea with movement. Pt resting, call bell within reach. Will continue to monitor patient.

## 2021-03-13 NOTE — PROGRESS NOTES
Hospitalist Progress Note    Patient: Miya Wolff MRN: [id number]  CSN: [id number]    YOB: 1960  Age: 62 y.o. Sex: male    DOA: 3/12/2021 LOS:  LOS: 1 day          Chief Complaint:     male with obesity, type 2 diabetes mellitus, and hypertension is admitted with hypoxia due to COVID-19 pneumonia. He has multiple comorbidities which worsen his chance of having a severe disease course.       Erika has a friend in [place] on lifesport with young daugher     Assessment/Plan     Hypoxia due to COVID-19 pneumonia  -Nasal cannula oxygen as needed  -Dexamethasone protocol  -Remdesivir  -Convalescent plasma  --Infectious disease consult  --Prone positioning as tolerated     Hypertension  -Continue home medications     Type 2 diabetes mellitus  -Diabetic diet  -Sliding scale insulin  --Continue lantus at 10 units twice daily (normally on 15 units twice daily)  -Follow-up hemoglobin A1c     Obesity-BMI 31    Patient Active Problem List   Diagnosis Code    Hypoxia R09.02    Pneumonia due to COVID-19 virus U07.1, J12.82    Obesity (BMI 30-39. 9) E66.9    Type 2 diabetes mellitus ([facility] [address] E11.9    Hypertension I10       Subjective:        Review of systems:    Constitutional: denies fevers, chills, myalgias  Respiratory: +s SOB, cough  Cardiovascular: denies chest pain, palpitations  Gastrointestinal: denies nausea, vomiting, diarrhea      Vital signs/Intake and Output:  Visit Vitals  /84 (BP 1 Location: Right upper arm, BP Patient Position: At rest)   Pulse 86   Temp 98.7 °F (37.1 °C)   Resp 20   Ht 5' 9\" (1.753 m)   Wt 96.2 kg (212 lb)   SpO2 94%   BMI 31.31 kg/m²     Current Shift:  No intake/output data recorded. Last three shifts:  03/11 1901 - 03/13 0700  In: 510 [P.O.:240;  I.V.:270]  Out: -     Exam:    General: Well developed, alert, NAD, OX3  Head/Neck: NCAT, supple, No masses, No lymphadenopathy  CVS:Regular rate and rhythm, no M/R/G, S1/S2 heard, no thrill  Lungs:Clear to auscultation bilaterally, no wheezes, rhonchi, or rales  Abdomen: Soft, Nontender, No distention, Normal Bowel sounds, No hepatomegaly  Extremities: No C/C/E, pulses palpable 2+  Skin:normal texture and turgor, no rashes, no lesions  Neuro:grossly normal , follows commands  Psych:appropriate                Labs: Results:       Chemistry Recent Labs     03/13/21 0553 03/12/21 2000   * 328*   * 136   K 4.5 4.1   CL 96* 99*   CO2 25 26   BUN 32* 23*   CREA 1.39* 1.21   CA 9.1 9.5   AGAP 11 11   BUCR 23* 19   AP 96 98   TP 6.5 6.7   ALB 3.0* 3.1*   GLOB 3.5 3.6   AGRAT 0.9 0.9      CBC w/Diff Recent Labs     03/13/21 0553 03/12/21 2000   WBC 4.3* 6.4   RBC 4.20* 4.36*   HGB 13.6 14.2   HCT 39.5 40.9    221   GRANS  --  84*   LYMPH  --  12*   EOS  --  0      Cardiac Enzymes Recent Labs     03/12/21 2000   CPK 42   CKND1 CALCULATION NOT PERFORMED WHEN RESULT IS BELOW LINEAR LIMIT      Coagulation Recent Labs     03/12/21 2000   PTP 14.5   INR 1.1   APTT 43.4*       Lipid Panel No results found for: CHOL, CHOLPOCT, CHOLX, CHLST, CHOLV, 991190, HDL, HDLP, LDL, LDLC, DLDLP, 992099, VLDLC, VLDL, TGLX, TRIGL, TRIGP, TGLPOCT, CHHD, CHHDX   BNP No results for input(s): BNPP in the last 72 hours.    Liver Enzymes Recent Labs     03/13/21 0553   TP 6.5   ALB 3.0*   AP 96      Thyroid Studies No results found for: T4, T3U, TSH, TSHEXT     Procedures/imaging: see electronic medical records for all procedures/Xrays and details which were not copied into this note but were reviewed prior to creation of Kendrick Liu MD

## 2021-03-13 NOTE — ED PROVIDER NOTES
EMERGENCY DEPARTMENT HISTORY AND PHYSICAL EXAM    Date: 3/12/2021  Patient Name: Nery Huang    History of Presenting Illness     Chief Complaint   Patient presents with    Shortness of Breath    Positive For Covid-19         History Provided By: Patient    8:39 PM  Nery Huang is a 62 y.o. male with PMHX of hypertension, high cholesterol, diabetes, diagnosed with COVID-19 infection on March 7 who presents to the emergency department C/O worsening shortness of breath. Patient reports since his diagnosis he has had fever, chest pain, shortness of breath, epigastric pain, nausea, poor p.o. intake. No clear relieving or exacerbating factors but the shortness of breath has progressively worsened. Denies any bowel or urinary complaints, lower extremity edema.      PCP: Ursula Cogan, MD    Current Facility-Administered Medications   Medication Dose Route Frequency Provider Last Rate Last Admin    atorvastatin (LIPITOR) tablet 10 mg  10 mg Oral QHS Burgess Philip MD        lisinopriL (PRINIVIL, ZESTRIL) tablet 5 mg  5 mg Oral DAILY Burgess Philip MD        levothyroxine (SYNTHROID) tablet 50 mcg  50 mcg Oral ACB Burgess Philip MD        insulin glargine (LANTUS) injection 10 Units  10 Units SubCUTAneous BID Burgess Philip MD        sodium chloride (NS) flush 5-10 mL  5-10 mL IntraVENous PRN Magalie Graham MD        sodium chloride (NS) flush 5-40 mL  5-40 mL IntraVENous Q8H Thomas LEW MD   10 mL at 03/13/21 0007    sodium chloride (NS) flush 5-40 mL  5-40 mL IntraVENous PRN Burgess Philip MD        acetaminophen (TYLENOL) tablet 650 mg  650 mg Oral Q6H PRN Burgess Philip MD        Or    acetaminophen (TYLENOL) suppository 650 mg  650 mg Rectal Q6H PRN Burgess Philip MD        polyethylene glycol (MIRALAX) packet 17 g  17 g Oral DAILY PRN Burgess Philip MD        promethazine (PHENERGAN) tablet 12.5 mg  12.5 mg Oral Q6H PRN Stephen Fernandez MD        Or    ondansetron [facility]) injection 4 mg  4 mg IntraVENous Q6H PRN Stephen Fernandez MD        enoxaparin (LOVENOX) injection 40 mg  40 mg SubCUTAneous Q12H Rachel LEW MD   40 mg at 03/13/21 0006    guaiFENesin-dextromethorphan (ROBITUSSIN DM) 100-10 mg/5 mL syrup 5 mL  5 mL Oral Q4H PRN Stephen Fernandez MD        dexAMETHasone (DECADRON) tablet 6 mg  6 mg Oral DAILY Stephen Fernandez MD        cholecalciferol (VITAMIN D3) (1000 Units /25 mcg) tablet 2,000 Units  2,000 Units Oral DAILY Stephen Fernandez MD        insulin lispro (HUMALOG) injection   SubCUTAneous AC&HS Stephen Fernandez MD        glucose chewable tablet 16 g  16 g Oral PRN Stephen Fernandez MD        glucagon (GLUCAGEN) injection 1 mg  1 mg IntraMUSCular PRN Stephen Fernandez MD        dextrose 10% infusion 125-250 mL  125-250 mL IntraVENous PRN Stephen Fernandez MD        famotidine (PEPCID) tablet 20 mg  20 mg Oral BID Stephen Fernandez MD        remdesivir 200 mg in 0.9% sodium chloride 250 mL IVPB  200 mg IntraVENous Broderick LEW MD   200 mg at 03/13/21 0321    Followed by   Alayna Ferrari ON 3/14/2021] remdesivir 100 mg in 0.9% sodium chloride 250 mL IVPB  100 mg IntraVENous Q24H Stephen Fernandez MD           Past History     Past Medical History:  Past Medical History:   Diagnosis Date    Diabetes ([facility] [address]     Hashimoto's disease     High cholesterol     Hypertension     Obesity (BMI 30-39.9) 3/13/2021       Past Surgical History:  Past Surgical History:   Procedure Laterality Date    HX KNEE ARTHROSCOPY         Family History:  Family History   Problem Relation Age of Onset    Diabetes Mother     Heart Disease Mother     Diabetes Father     Diabetes Sister     Diabetes Brother     Heart Disease Maternal Grandfather        Social History:  Social History     Tobacco Use    Smoking status: Never Smoker    Smokeless tobacco: Never Used   Substance Use Topics    Alcohol use: No    Drug use: Not on file       Allergies: Allergies   Allergen Reactions    Glipizide Other (comments)     Kidney problems      Metformin Other (comments)     Kidney problems             Review of Systems   Review of Systems   Constitutional: Positive for fatigue and fever. Respiratory: Positive for cough and shortness of breath. Cardiovascular: Positive for chest pain. Gastrointestinal: Positive for abdominal pain and nausea. Genitourinary: Negative for difficulty urinating. All other systems reviewed and are negative.         Physical Exam     Vitals:    03/12/21 2145 03/12/21 2200 03/12/21 2215 03/12/21 2230   BP: 127/67 (!) 132/59 130/68 (!) 141/70   Pulse: 71 74 69 69   Resp: (!) 35 28 (!) 35 (!) 33   Temp:    98 °F (36.7 °C)   SpO2: 99% 99% 99% 98%   Weight:       Height:         Physical Exam    Nursing notes and vital signs reviewed    Constitutional: Non toxic appearing, moderate distress  Head: Normocephalic, Atraumatic  Eyes: EOMI  Neck: Supple  Cardiovascular: Tachycardic and regular rhythm, no murmurs, rubs, or gallops  Chest: Increased work of breathing and equal chest excursion bilaterally  Lungs: Diminished to ausculation bilaterally  Abdomen: Soft, epigastric tenderness without rebound or guarding, otherwise nontender, non distended, normoactive bowel sounds  Back: No evidence of trauma or deformity  Extremities: No evidence of trauma or deformity, no LE edema  Skin: Warm and dry, normal cap refill  Neuro: Alert and appropriate  Psychiatric: Normal mood and affect      Diagnostic Study Results     Labs -     Recent Results (from the past 12 hour(s))   METABOLIC PANEL, COMPREHENSIVE    Collection Time: 03/12/21  8:00 PM   Result Value Ref Range    Sodium 136 136 - 145 mmol/L    Potassium 4.1 3.5 - 5.5 mmol/L    Chloride 99 (L) 100 - 111 mmol/L    CO2 26 21 - 32 mmol/L    Anion gap 11 3.0 - 18 mmol/L    Glucose 328 (H) 74 - 99 mg/dL    BUN 23 (H) 7.0 - 18 MG/DL    Creatinine 1.21 0.6 - 1.3 MG/DL    BUN/Creatinine ratio 19 12 - 20      GFR est AA >60 >60 ml/min/1.73m2    GFR est non-AA >60 >60 ml/min/1.73m2    Calcium 9.5 8.5 - 10.1 MG/DL    Bilirubin, total 0.5 0.2 - 1.0 MG/DL    ALT (SGPT) 16 16 - 61 U/L    AST (SGOT) 23 10 - 38 U/L    Alk. phosphatase 98 45 - 117 U/L    Protein, total 6.7 6.4 - 8.2 g/dL    Albumin 3.1 (L) 3.4 - 5.0 g/dL    Globulin 3.6 2.0 - 4.0 g/dL    A-G Ratio 0.9 0.8 - 1.7     CBC WITH AUTOMATED DIFF    Collection Time: 03/12/21  8:00 PM   Result Value Ref Range    WBC 6.4 4.6 - 13.2 K/uL    RBC 4.36 (L) 4.70 - 5.50 M/uL    HGB 14.2 13.0 - 16.0 g/dL    HCT 40.9 36.0 - 48.0 %    MCV 93.8 74.0 - 97.0 FL    MCH 32.6 24.0 - 34.0 PG    MCHC 34.7 31.0 - 37.0 g/dL    RDW 11.4 (L) 11.6 - 14.5 %    PLATELET 641 654 - 475 K/uL    MPV 10.5 9.2 - 11.8 FL    NEUTROPHILS 84 (H) 40 - 73 %    LYMPHOCYTES 12 (L) 21 - 52 %    MONOCYTES 4 3 - 10 %    EOSINOPHILS 0 0 - 5 %    BASOPHILS 0 0 - 2 %    ABS. NEUTROPHILS 5.4 1.8 - 8.0 K/UL    ABS. LYMPHOCYTES 0.8 (L) 0.9 - 3.6 K/UL    ABS. MONOCYTES 0.3 0.05 - 1.2 K/UL    ABS. EOSINOPHILS 0.0 0.0 - 0.4 K/UL    ABS.  BASOPHILS 0.0 0.0 - 0.1 K/UL    DF AUTOMATED     MAGNESIUM    Collection Time: 03/12/21  8:00 PM   Result Value Ref Range    Magnesium 1.8 1.6 - 2.6 mg/dL   PROTHROMBIN TIME + INR    Collection Time: 03/12/21  8:00 PM   Result Value Ref Range    Prothrombin time 14.5 11.5 - 15.2 sec    INR 1.1 0.8 - 1.2     PTT    Collection Time: 03/12/21  8:00 PM   Result Value Ref Range    aPTT 43.4 (H) 23.0 - 36.4 SEC   D DIMER    Collection Time: 03/12/21  8:00 PM   Result Value Ref Range    D DIMER 0.48 (H) <0.46 ug/ml(FEU)   NT-PRO BNP    Collection Time: 03/12/21  8:00 PM   Result Value Ref Range    NT pro-BNP 85 0 - 900 PG/ML   LIPASE    Collection Time: 03/12/21  8:00 PM   Result Value Ref Range    Lipase 86 73 - 393 U/L   CARDIAC PANEL,(CK, CKMB & TROPONIN)    Collection Time: 03/12/21  8:00 PM   Result Value Ref Range    CK - MB <1.0 <3.6 ng/ml    CK-MB Index  0.0 - 4.0 %     CALCULATION NOT PERFORMED WHEN RESULT IS BELOW LINEAR LIMIT    CK 42 39 - 308 U/L    Troponin-I, QT <0.02 0.0 - 0.045 NG/ML   EKG, 12 LEAD, INITIAL    Collection Time: 03/12/21  8:54 PM   Result Value Ref Range    Ventricular Rate 78 BPM    Atrial Rate 78 BPM    P-R Interval 152 ms    QRS Duration 86 ms    Q-T Interval 350 ms    QTC Calculation (Bezet) 399 ms    Calculated P Axis 55 degrees    Calculated R Axis 56 degrees    Calculated T Axis 66 degrees    Diagnosis       Normal sinus rhythm  Normal ECG  When compared with ECG of 12-FEB-2012 23:15,  No significant change was found     POC G3    Collection Time: 03/12/21  9:02 PM   Result Value Ref Range    Device: ROOM AIR      FIO2 (POC) 0.21 %    pH (POC) 7.40 7.35 - 7.45      pCO2 (POC) 39.4 35.0 - 45.0 MMHG    pO2 (POC) 60 (L) 80 - 100 MMHG    HCO3 (POC) 24.2 22 - 26 MMOL/L    sO2 (POC) 91 (L) 92 - 97 %    Base deficit (POC) 1 mmol/L    Allens test (POC) N/A      Total resp. rate 39      Site RIGHT RADIAL      Patient temp. 97.8      Specimen type (POC) ARTERIAL      Performed by Kizzy Massey    POC LACTIC ACID    Collection Time: 03/12/21  9:10 PM   Result Value Ref Range    Lactic Acid (POC) 1.86 0.40 - 2.00 mmol/L       Radiologic Studies -   XR CHEST PORT   Final Result      New bilateral lower lung field patchy infiltrates. The appearance is highly   suggestive of Covid pneumonia. X-RAY FINDINGS:  9:45 PM  Chest x-ray shows multifocal opacities consistent with COVID-19 infection  Read by Dr. Noble Roche, Pending review by Radiologist  CT Results  (Last 48 hours)    None        CXR Results  (Last 48 hours)               03/12/21 2117  XR CHEST PORT Final result    Impression:      New bilateral lower lung field patchy infiltrates. The appearance is highly   suggestive of Covid pneumonia.        Narrative:  EXAM: CHEST RADIOGRAPH, SINGLE VIEW CLINICAL INDICATION/HISTORY: meets SIRS criteria. Covid positive       COMPARISON: Single view chest 3/8/2021       TECHNIQUE: Portable frontal view of the chest was obtained.        _______________       FINDINGS:       SUPPORT DEVICES: None. HEART AND MEDIASTINUM: Cardiomediastinal silhouette appears within normal   limits. Normal caliber thoracic aorta. No central vascular congestion. LUNGS AND PLEURAL SPACES: New patchy infiltrates bilateral peripheral lower lung   fields. No focal alveolar consolidation or effusion. No pneumothorax. BONY THORAX AND SOFT TISSUES: No acute osseous abnormality.        _______________                 Medications given in the ED-  Medications   sodium chloride (NS) flush 5-10 mL (has no administration in time range)   sodium chloride (NS) flush 5-40 mL (10 mL IntraVENous Given 3/13/21 0007)   sodium chloride (NS) flush 5-40 mL (has no administration in time range)   acetaminophen (TYLENOL) tablet 650 mg (has no administration in time range)     Or   acetaminophen (TYLENOL) suppository 650 mg (has no administration in time range)   polyethylene glycol (MIRALAX) packet 17 g (has no administration in time range)   promethazine (PHENERGAN) tablet 12.5 mg (has no administration in time range)     Or   ondansetron (ZOFRAN) injection 4 mg (has no administration in time range)   enoxaparin (LOVENOX) injection 40 mg (40 mg SubCUTAneous Given 3/13/21 0006)   guaiFENesin-dextromethorphan (ROBITUSSIN DM) 100-10 mg/5 mL syrup 5 mL (has no administration in time range)   dexAMETHasone (DECADRON) tablet 6 mg (has no administration in time range)   cholecalciferol (VITAMIN D3) (1000 Units /25 mcg) tablet 2,000 Units (has no administration in time range)   insulin lispro (HUMALOG) injection (has no administration in time range)   glucose chewable tablet 16 g (has no administration in time range)   glucagon (GLUCAGEN) injection 1 mg (has no administration in time range) dextrose 10% infusion 125-250 mL (has no administration in time range)   famotidine (PEPCID) tablet 20 mg (has no administration in time range)   remdesivir 200 mg in 0.9% sodium chloride 250 mL IVPB (200 mg IntraVENous Given 3/13/21 0321)     Followed by   remdesivir 100 mg in 0.9% sodium chloride 250 mL IVPB (has no administration in time range)   atorvastatin (LIPITOR) tablet 10 mg (has no administration in time range)   lisinopriL (PRINIVIL, ZESTRIL) tablet 5 mg (has no administration in time range)   levothyroxine (SYNTHROID) tablet 50 mcg (has no administration in time range)   insulin glargine (LANTUS) injection 10 Units (has no administration in time range)   dexamethasone (DECADRON) 4 mg/mL injection 6 mg (6 mg IntraVENous Given 3/12/21 2101)   ondansetron (ZOFRAN) injection 4 mg (4 mg IntraVENous Given 3/12/21 2101)   famotidine (PF) (PEPCID) injection 20 mg (20 mg IntraVENous Given 3/12/21 2101)         Medical Decision Making   I am the first provider for this patient. I reviewed the vital signs, available nursing notes, past medical history, past surgical history, family history and social history. Vital Signs-Reviewed the patient's vital signs. Pulse Oximetry Analysis - 90% on room air, borderline    Cardiac Monitor:  Rate: 76 bpm  Rhythm: Normal sinus    EKG interpretation: (Preliminary)  EKG read by Dr. French Kohler at 9:13 PM  Normal sinus rhythm rate of 70 bpm, MD interval 152 ms, QRS duration of 86 ms, no prior available for comparison    Records Reviewed: Nursing Notes and Old Medical Records    Provider Notes (Medical Decision Making): Mathew Gonzalez is a 62 y.o. male presenting with known COVID-19 infection with increasing shortness of breath and now with an oxygen requirement. Cardiac enzymes and D-dimer negative. X-ray has significantly worsened since prior ED visit. IV steroids initiated and discussed with hospitalist for further in-hospital management.   Patient understands and agrees with this plan. Procedures:  Procedures    ED Course:   9:45 PM  Updated patient on all results and plan. All questions answered. CONSULT NOTE:   10:07 PM  Dr. Concha Berry spoke with Dr. Christian Knowles   Specialty: Hospitalist  Discussed pt's hx, disposition, and available diagnostic and imaging results over the telephone. Reviewed care plans. Accepts to telemetry. Diagnosis and Disposition     Critical Care Time: 9:45 PM  I have spent 37 minutes of critical care time involved in lab review, consultations with specialist, family decision-making, and documentation. During this entire length of time I was immediately available to the patient. Critical Care: The reason for providing this level of medical care for this critically ill patient was due a critical illness that impaired one or more vital organ systems such that there was a high probability of imminent or life threatening deterioration in the patients condition. This care involved high complexity decision making to assess, manipulate, and support vital system functions, to treat this degreee vital organ system failure and to prevent further life threatening deterioration of the patients condition. Core Measures:  For Hospitalized Patients:    1. Hospitalization Decision Time:  The decision to hospitalize the patient was made by Dr. Concha Berry at 8:45 PM on 3/12/2021    2. Aspirin: Aspirin was not given because the patient did not present with a stroke at the time of their Emergency Department evaluation    9:45 PM  Patient is being admitted to the hospital by Dr. Christian Knowles. The results of their tests and reasons for their admission have been discussed with them and/or available family. They convey agreement and understanding for the need to be admitted and for their admission diagnosis. CONDITIONS ON ADMISSION:  Sepsis is not present at the time of admission. Deep Vein Thrombosis is not present at the time of admission. Thrombosis is not present at the time of admission. Urinary Tract Infection is not present at the time of admission. Pneumonia is present at the time of admission. MRSA is not present at the time of admission. Wound infection is not present at the time of admission. Pressure Ulcer is not present at the time of admission. CLINICAL IMPRESSION:    1. COVID-19    2. Hypoxia      _______________________________      Please note that this dictation was completed with [employer], the computer voice recognition software. Quite often unanticipated grammatical, syntax, homophones, and other interpretive errors are inadvertently transcribed by the computer software. Please disregard these errors. Please excuse any errors that have escaped final proofreading.

## 2021-03-13 NOTE — ROUTINE PROCESS
TRANSFER - OUT REPORT:    Verbal report given to RAIZA Marcano RN on Marisol and Hector  being transferred to Telemetry for routine progression of care       Report consisted of patients Situation, Background, Assessment and   Recommendations(SBAR). Information from the following report(s) SBAR, ED Summary and Recent Results was reviewed with the receiving nurse. Lines:   Peripheral IV 03/12/21 Left Antecubital (Active)   Site Assessment Clean, dry, & intact 03/12/21 2059   Phlebitis Assessment 0 03/12/21 2059   Infiltration Assessment 0 03/12/21 2059   Dressing Status Clean, dry, & intact 03/12/21 2059   Dressing Type Transparent 03/12/21 2059   Hub Color/Line Status Green;Patent; Flushed 03/12/21 2059   Action Taken Blood drawn 03/12/21 2059        Opportunity for questions and clarification was provided.       Patient transported with:   [employer]

## 2021-03-13 NOTE — PROGRESS NOTES
Problem: Falls - Risk of  Goal: *Absence of Falls  Description: Document Aleks Ruiz Fall Risk and appropriate interventions in the flowsheet.   Outcome: Progressing Towards Goal  Note: Fall Risk Interventions:            Medication Interventions: Assess postural VS orthostatic hypotension, Teach patient to arise slowly                   Problem: Patient Education: Go to Patient Education Activity  Goal: Patient/Family Education  Outcome: Progressing Towards Goal     Problem: Pain  Goal: *Control of Pain  Outcome: Progressing Towards Goal     Problem: Patient Education: Go to Patient Education Activity  Goal: Patient/Family Education  Outcome: Progressing Towards Goal     Problem: Breathing Pattern - Ineffective  Goal: *Absence of hypoxia  Outcome: Progressing Towards Goal  Goal: *Use of effective breathing techniques  Outcome: Progressing Towards Goal     Problem: Patient Education: Go to Patient Education Activity  Goal: Patient/Family Education  Outcome: Progressing Towards Goal

## 2021-03-13 NOTE — H&P
History & Physical    Patient: Sherry Abreu MRN: [id number]  CSN: [id number]    YOB: 1960  Age: 62 y.o. Sex: male      DOA: 3/12/2021  Primary Care Provider:  Keisha Saucedo MD      Assessment/Plan     Patient Active Problem List   Diagnosis Code    Hypoxia R09.02    Pneumonia due to COVID-19 virus U07.1, J12.82    Obesity (BMI 30-39. 9) E66.9    Type 2 diabetes mellitus ([facility] [address] E11.9    Hypertension I5     11year-old male with obesity, type 2 diabetes mellitus, and hypertension is admitted with hypoxia due to COVID-19 pneumonia. He has multiple comorbidities which worsen his chance of having a severe disease course. Hypoxia due to COVID-19 pneumonia  -Nasal cannula oxygen as needed  -Dexamethasone protocol  -Remdesivir  -Convalescent plasma  --Infectious disease consult  --Prone positioning as tolerated    Hypertension  -Continue home medications    Type 2 diabetes mellitus  -Diabetic diet  -Sliding scale insulin  --Continue lantus at 10 units twice daily (normally on 15 units twice daily)  -Follow-up hemoglobin A1c    Obesity-BMI 31  -Aggressive lifestyle modification needed    Full code    Prophylaxis: Pepcid p.o., Lovenox SQ    Estimated length of stay : 3-4 nights    MD Michell De Jesusist  ----------------------------------------------------------------------------------------------------------------------------------------------------------------------------------------------------------------  CC: Shortness of breath       HPI:     Sherry Abreu is a 62 y.o. male with obesity, type 2 diabetes mellitus, and hypertension who presents to the ED with worsening cough and shortness of breath. He started getting sick and tested positive for COVID-19 on 3/7. Has also has had fever. Denies chest pain, diarrhea, nausea, or vomiting. He works at [employer].     Past Medical History:   Diagnosis Date    Diabetes ([facility] [address]     Hashimoto's disease     High cholesterol     Hypertension     Obesity (BMI 30-39.9) 3/13/2021       Past Surgical History:   Procedure Laterality Date    HX KNEE ARTHROSCOPY         Family History   Problem Relation Age of Onset    Diabetes Mother     Heart Disease Mother     Diabetes Father     Diabetes Sister     Diabetes Brother     Heart Disease Maternal Grandfather        Social History     Socioeconomic History    Marital status: SINGLE     Spouse name: Not on file    Number of children: Not on file    Years of education: Not on file    Highest education level: Not on file   Tobacco Use    Smoking status: Never Smoker    Smokeless tobacco: Never Used   Substance and Sexual Activity    Alcohol use: No   Other Topics Concern       Prior to Admission medications    Medication Sig Start Date End Date Taking? Authorizing Provider   albuterol (PROVENTIL HFA, VENTOLIN HFA, PROAIR HFA) 90 mcg/actuation inhaler Take 2 Puffs by inhalation every four (4) hours as needed for Wheezing. 3/8/21   Alexia Aguilar PA   doxycycline (VIBRA-TABS) 100 mg tablet Take 1 Tab by mouth two (2) times a day for 7 days. 3/8/21 3/15/21  Alexia Aguilar PA   insulin detemir (LEVEMIR) 100 unit/mL injection by SubCUTAneous route nightly. Kendall Thomas MD   insulin lispro (HUMALOG) 100 unit/mL injection by SubCUTAneous route. Kendall Thomas MD   levothyroxine (SYNTHROID) 50 mcg tablet Take  by mouth Daily (before breakfast). Kendall Thomas MD   lisinopril (PRINIVIL, ZESTRIL) 5 mg tablet Take 5 mg by mouth daily. Kendall Thomas MD   simvastatin (ZOCOR) 20 mg tablet Take  by mouth nightly. Kendall Thomas MD   ibuprofen (MOTRIN) 600 mg tablet Take 1 Tab by mouth every six (6) hours as needed for Pain. 9/11/17   Alexia Aguilar PA   cyclobenzaprine (FLEXERIL) 5 mg tablet Take 1 Tab by mouth three (3) times daily as needed for Muscle Spasm(s).  9/11/17   ANETA Ramos       Allergies   Allergen Reactions    Glipizide Other (comments) Kidney problems      Metformin Other (comments)     Kidney problems           Review of Systems  Gen: +fever, chills, malaise  Heent: +headache, no rhinorrhea, sore throat. Heart: No chest pain, palpitations, MEDRANO, pnd, or orthopnea. Resp: +cough and shortness of breath. GI: No nausea, vomiting, diarrhea, constipation, melena or hematochezia. : No urinary obstruction, dysuria or hematuria. Derm: No rash, new skin lesion or pruritis. Musc/skeletal: no bone or joint complaints. Vasc: No edema, cyanosis or claudication. Endo: No heat/cold intolerance, no polyuria,polydipsia or polyphagia. Neuro: No unilateral weakness, numbness, tingling. No seizures. Heme: No easy bruising or bleeding. Physical Exam:     Physical Exam:  Visit Vitals  BP (!) 141/70 (BP 1 Location: Left arm, BP Patient Position: At rest)   Pulse 69   Temp 98 °F (36.7 °C)   Resp (!) 33   Ht 5' 9\" (1.753 m)   Wt 96.2 kg (212 lb)   SpO2 98%   BMI 31.31 kg/m²    O2 Flow Rate (L/min): 2 l/min O2 Device: Nasal cannula    Temp (24hrs), Av.9 °F (36.6 °C), Min:97.8 °F (36.6 °C), Max:98 °F (36.7 °C)    No intake/output data recorded. No intake/output data recorded. General:  Awake, cooperative, ill-appearing, no distress. Head:  Normocephalic, without obvious abnormality, atraumatic. Eyes:  Conjunctivae/corneas clear, sclera anicteric. Neck: Supple, symmetrical, trachea midline. Lungs:   Coarse breath sounds in bilateral lower lung fields. Heart:  Regular rate and rhythm, S1, S2 normal, no murmur, click, rub or gallop. Abdomen: Soft, mild tenderness to palpation on the left side, no rebound tenderness. Bowel sounds normal. No masses,  No organomegaly. Extremities: Extremities normal, atraumatic, no cyanosis or edema. Capillary refill normal.   Pulses: 2+ and symmetric all extremities. Skin: Skin color pink, turgor normal. No rashes or lesions   Neurologic: No focal motor or sensory deficit.        Labs Reviewed: All lab results for the last 24 hours reviewed. Recent Results (from the past 24 hour(s))   METABOLIC PANEL, COMPREHENSIVE    Collection Time: 03/12/21  8:00 PM   Result Value Ref Range    Sodium 136 136 - 145 mmol/L    Potassium 4.1 3.5 - 5.5 mmol/L    Chloride 99 (L) 100 - 111 mmol/L    CO2 26 21 - 32 mmol/L    Anion gap 11 3.0 - 18 mmol/L    Glucose 328 (H) 74 - 99 mg/dL    BUN 23 (H) 7.0 - 18 MG/DL    Creatinine 1.21 0.6 - 1.3 MG/DL    BUN/Creatinine ratio 19 12 - 20      GFR est AA >60 >60 ml/min/1.73m2    GFR est non-AA >60 >60 ml/min/1.73m2    Calcium 9.5 8.5 - 10.1 MG/DL    Bilirubin, total 0.5 0.2 - 1.0 MG/DL    ALT (SGPT) 16 16 - 61 U/L    AST (SGOT) 23 10 - 38 U/L    Alk. phosphatase 98 45 - 117 U/L    Protein, total 6.7 6.4 - 8.2 g/dL    Albumin 3.1 (L) 3.4 - 5.0 g/dL    Globulin 3.6 2.0 - 4.0 g/dL    A-G Ratio 0.9 0.8 - 1.7     CBC WITH AUTOMATED DIFF    Collection Time: 03/12/21  8:00 PM   Result Value Ref Range    WBC 6.4 4.6 - 13.2 K/uL    RBC 4.36 (L) 4.70 - 5.50 M/uL    HGB 14.2 13.0 - 16.0 g/dL    HCT 40.9 36.0 - 48.0 %    MCV 93.8 74.0 - 97.0 FL    MCH 32.6 24.0 - 34.0 PG    MCHC 34.7 31.0 - 37.0 g/dL    RDW 11.4 (L) 11.6 - 14.5 %    PLATELET 081 720 - 078 K/uL    MPV 10.5 9.2 - 11.8 FL    NEUTROPHILS 84 (H) 40 - 73 %    LYMPHOCYTES 12 (L) 21 - 52 %    MONOCYTES 4 3 - 10 %    EOSINOPHILS 0 0 - 5 %    BASOPHILS 0 0 - 2 %    ABS. NEUTROPHILS 5.4 1.8 - 8.0 K/UL    ABS. LYMPHOCYTES 0.8 (L) 0.9 - 3.6 K/UL    ABS. MONOCYTES 0.3 0.05 - 1.2 K/UL    ABS. EOSINOPHILS 0.0 0.0 - 0.4 K/UL    ABS.  BASOPHILS 0.0 0.0 - 0.1 K/UL    DF AUTOMATED     MAGNESIUM    Collection Time: 03/12/21  8:00 PM   Result Value Ref Range    Magnesium 1.8 1.6 - 2.6 mg/dL   PROTHROMBIN TIME + INR    Collection Time: 03/12/21  8:00 PM   Result Value Ref Range    Prothrombin time 14.5 11.5 - 15.2 sec    INR 1.1 0.8 - 1.2     PTT    Collection Time: 03/12/21  8:00 PM   Result Value Ref Range    aPTT 43.4 (H) 23.0 - 36.4 [address]   D DIMER    Collection Time: 03/12/21  8:00 PM   Result Value Ref Range    D DIMER 0.48 (H) <0.46 ug/ml(FEU)   NT-PRO BNP    Collection Time: 03/12/21  8:00 PM   Result Value Ref Range    NT pro-BNP 85 0 - 900 PG/ML   LIPASE    Collection Time: 03/12/21  8:00 PM   Result Value Ref Range    Lipase 86 73 - 393 U/L   CARDIAC PANEL,(CK, CKMB & TROPONIN)    Collection Time: 03/12/21  8:00 PM   Result Value Ref Range    CK - MB <1.0 <3.6 ng/ml    CK-MB Index  0.0 - 4.0 %     CALCULATION NOT PERFORMED WHEN RESULT IS BELOW LINEAR LIMIT    CK 42 39 - 308 U/L    Troponin-I, QT <0.02 0.0 - 0.045 NG/ML   EKG, 12 LEAD, INITIAL    Collection Time: 03/12/21  8:54 PM   Result Value Ref Range    Ventricular Rate 78 BPM    Atrial Rate 78 BPM    P-R Interval 152 ms    QRS Duration 86 ms    Q-T Interval 350 ms    QTC Calculation (Bezet) 399 ms    Calculated P Axis 55 degrees    Calculated R Axis 56 degrees    Calculated T Axis 66 degrees    Diagnosis       Normal sinus rhythm  Normal ECG  When compared with ECG of 12-FEB-2012 23:15,  No significant change was found     POC G3    Collection Time: 03/12/21  9:02 PM   Result Value Ref Range    Device: ROOM AIR      FIO2 (POC) 0.21 %    pH (POC) 7.40 7.35 - 7.45      pCO2 (POC) 39.4 35.0 - 45.0 MMHG    pO2 (POC) 60 (L) 80 - 100 MMHG    HCO3 (POC) 24.2 22 - 26 MMOL/L    sO2 (POC) 91 (L) 92 - 97 %    Base deficit (POC) 1 mmol/L    Allens test (POC) N/A      Total resp.  rate 39      Site RIGHT RADIAL      Patient temp. 97.8      Specimen type (POC) ARTERIAL      Performed by Michelle Estrella    POC LACTIC ACID    Collection Time: 03/12/21  9:10 PM   Result Value Ref Range    Lactic Acid (POC) 1.86 0.40 - 2.00 mmol/L       Results  XR CHEST PORT (Accession [id number]) (Order [id number])  Allergies     Not Specified: Glipizide;  Metformin   Exam Information    Status Exam Begun  Exam Ended    Final [99] 3/12/2021 20:54 3/12/2021  9:17 PM [id number]  9:17 PM   Result Information    Status: Final result (Exam End: 3/12/2021 21:17) Provider Status: Open   Study Result    EXAM: CHEST RADIOGRAPH, SINGLE VIEW     CLINICAL INDICATION/HISTORY: meets SIRS criteria. Covid positive     COMPARISON: Single view chest 3/8/2021     TECHNIQUE: Portable frontal view of the chest was obtained.      _______________     FINDINGS:     SUPPORT DEVICES: None.     HEART AND MEDIASTINUM: Cardiomediastinal silhouette appears within normal  limits. Normal caliber thoracic aorta. No central vascular congestion.     LUNGS AND PLEURAL SPACES: New patchy infiltrates bilateral peripheral lower lung  fields. No focal alveolar consolidation or effusion. No pneumothorax.       BONY THORAX AND SOFT TISSUES: No acute osseous abnormality.     _______________     IMPRESSION     New bilateral lower lung field patchy infiltrates. The appearance is highly  suggestive of Covid pneumonia.

## 2021-03-14 ENCOUNTER — APPOINTMENT (OUTPATIENT)
Dept: NON INVASIVE DIAGNOSTICS | Age: 61
DRG: 177 | End: 2021-03-14
Attending: INTERNAL MEDICINE
Payer: COMMERCIAL

## 2021-03-14 ENCOUNTER — APPOINTMENT (OUTPATIENT)
Dept: VASCULAR SURGERY | Age: 61
DRG: 177 | End: 2021-03-14
Attending: FAMILY MEDICINE
Payer: COMMERCIAL

## 2021-03-14 LAB
ALBUMIN SERPL-MCNC: 2.7 G/DL (ref 3.4–5)
ALBUMIN/GLOB SERPL: 0.8 {RATIO} (ref 0.8–1.7)
ALP SERPL-CCNC: 98 U/L (ref 45–117)
ALT SERPL-CCNC: 23 U/L (ref 16–61)
ANION GAP SERPL CALC-SCNC: 7 MMOL/L (ref 3–18)
AST SERPL-CCNC: 22 U/L (ref 10–38)
AV VELOCITY RATIO: 0.87
AV VTI RATIO: 0.8
BILIRUB SERPL-MCNC: 0.3 MG/DL (ref 0.2–1)
BUN SERPL-MCNC: 44 MG/DL (ref 7–18)
BUN/CREAT SERPL: 32 (ref 12–20)
CALCIUM SERPL-MCNC: 9 MG/DL (ref 8.5–10.1)
CHLORIDE SERPL-SCNC: 99 MMOL/L (ref 100–111)
CO2 SERPL-SCNC: 27 MMOL/L (ref 21–32)
CREAT SERPL-MCNC: 1.37 MG/DL (ref 0.6–1.3)
CRP SERPL-MCNC: 6.4 MG/DL (ref 0–0.3)
D DIMER PPP FEU-MCNC: 0.29 UG/ML(FEU)
ECHO AV AREA PEAK VELOCITY: 2.6 CM2
ECHO AV AREA VTI: 2.3 CM2
ECHO AV AREA/BSA PEAK VELOCITY: 1.2 CM2/M2
ECHO AV AREA/BSA VTI: 1.1 CM2/M2
ECHO AV MEAN GRADIENT: 3.1 MMHG
ECHO AV MEAN VELOCITY: 0.85 M/S
ECHO AV PEAK GRADIENT: 5.6 MMHG
ECHO AV PEAK VELOCITY: 118.32 CM/S
ECHO AV VTI: 27.04 CM
ECHO IVC PROX: 1.69 CM
ECHO LA AREA 2C: 18.17 CM2
ECHO LA AREA 4C: 19 CM2
ECHO LA MAJOR AXIS: 4.43 CM
ECHO LA MINOR AXIS: 2.09 CM
ECHO LA VOL 2C: 53.6 ML (ref 18–58)
ECHO LA VOL 4C: 56.68 ML (ref 18–58)
ECHO LA VOLUME INDEX A2C: 25.31 ML/M2 (ref 16–28)
ECHO LA VOLUME INDEX A4C: 26.77 ML/M2 (ref 16–28)
ECHO LV E' LATERAL VELOCITY: 10 CM/S
ECHO LV E' SEPTAL VELOCITY: 8 CM/S
ECHO LV EDV A2C: 101.1 ML
ECHO LV EDV A4C: 123.3 ML
ECHO LV EDV BP: 111.1 ML (ref 67–155)
ECHO LV EDV INDEX A4C: 58.2 ML/M2
ECHO LV EDV INDEX BP: 52.5 ML/M2
ECHO LV EDV NDEX A2C: 47.7 ML/M2
ECHO LV EDV TEICHHOLZ: 0.48 ML
ECHO LV EJECTION FRACTION A2C: 71 %
ECHO LV EJECTION FRACTION A4C: 53 %
ECHO LV EJECTION FRACTION BIPLANE: 60.2 % (ref 55–100)
ECHO LV ESV A2C: 29.2 ML
ECHO LV ESV A4C: 58.2 ML
ECHO LV ESV BP: 44.2 ML (ref 22–58)
ECHO LV ESV INDEX A2C: 13.8 ML/M2
ECHO LV ESV INDEX A4C: 27.5 ML/M2
ECHO LV ESV INDEX BP: 20.9 ML/M2
ECHO LV ESV TEICHHOLZ: 0.13 ML
ECHO LV INTERNAL DIMENSION DIASTOLIC: 4.31 CM (ref 4.2–5.9)
ECHO LV INTERNAL DIMENSION SYSTOLIC: 2.51 CM
ECHO LV IVSD: 0.88 CM (ref 0.6–1)
ECHO LV MASS 2D: 137.1 G (ref 88–224)
ECHO LV MASS INDEX 2D: 64.8 G/M2 (ref 49–115)
ECHO LV POSTERIOR WALL DIASTOLIC: 1.06 CM (ref 0.6–1)
ECHO LVOT CARDIAC OUTPUT: 3.7 L/MIN
ECHO LVOT DIAM: 1.96 CM
ECHO LVOT PEAK GRADIENT: 4.2 MMHG
ECHO LVOT PEAK VELOCITY: 102.93 CM/S
ECHO LVOT SV: 62.6 ML
ECHO LVOT VTI: 20.72 CM
ECHO MV A VELOCITY: 63.11 CM/S
ECHO MV AREA PHT: 2.3 CM2
ECHO MV E DECELERATION TIME (DT): 327.9 MS
ECHO MV E VELOCITY: 56.79 CM/S
ECHO MV E/A RATIO: 0.9
ECHO MV E/E' LATERAL: 5.68
ECHO MV E/E' RATIO (AVERAGED): 6.39
ECHO MV E/E' SEPTAL: 7.1
ECHO MV PRESSURE HALF TIME (PHT): 95.1 MS
ECHO RA VOLUME: 58.7 ML
ECHO RV INTERNAL DIMENSION: 4.69 CM
ERYTHROCYTE [DISTWIDTH] IN BLOOD BY AUTOMATED COUNT: 11.6 % (ref 11.6–14.5)
GLOBULIN SER CALC-MCNC: 3.3 G/DL (ref 2–4)
GLUCOSE BLD STRIP.AUTO-MCNC: 317 MG/DL (ref 70–110)
GLUCOSE BLD STRIP.AUTO-MCNC: 359 MG/DL (ref 70–110)
GLUCOSE BLD STRIP.AUTO-MCNC: 369 MG/DL (ref 70–110)
GLUCOSE BLD STRIP.AUTO-MCNC: 371 MG/DL (ref 70–110)
GLUCOSE SERPL-MCNC: 321 MG/DL (ref 74–99)
HCT VFR BLD AUTO: 36.5 % (ref 36–48)
HGB BLD-MCNC: 12.8 G/DL (ref 13–16)
LVOT MG: 2.14 MMHG
LVOT MV: 0.68 CM/S
LVSV (MOD BI): 30.95 ML
LVSV (MOD SINGLE 4C): 30.1 ML
LVSV (MOD SINGLE): 33.23 ML
MCH RBC QN AUTO: 32.7 PG (ref 24–34)
MCHC RBC AUTO-ENTMCNC: 35.1 G/DL (ref 31–37)
MCV RBC AUTO: 93.4 FL (ref 74–97)
MV DEC SLOPE: 1.73
PLATELET # BLD AUTO: 269 K/UL (ref 135–420)
PMV BLD AUTO: 10.6 FL (ref 9.2–11.8)
POTASSIUM SERPL-SCNC: 4.6 MMOL/L (ref 3.5–5.5)
PROT SERPL-MCNC: 6 G/DL (ref 6.4–8.2)
RBC # BLD AUTO: 3.91 M/UL (ref 4.7–5.5)
SODIUM SERPL-SCNC: 133 MMOL/L (ref 136–145)
WBC # BLD AUTO: 12.8 K/UL (ref 4.6–13.2)

## 2021-03-14 PROCEDURE — 65660000000 HC RM CCU STEPDOWN

## 2021-03-14 PROCEDURE — 93970 EXTREMITY STUDY: CPT

## 2021-03-14 PROCEDURE — 82962 GLUCOSE BLOOD TEST: CPT

## 2021-03-14 PROCEDURE — 74011636637 HC RX REV CODE- 636/637: Performed by: INTERNAL MEDICINE

## 2021-03-14 PROCEDURE — 74011000258 HC RX REV CODE- 258: Performed by: FAMILY MEDICINE

## 2021-03-14 PROCEDURE — 77010033678 HC OXYGEN DAILY

## 2021-03-14 PROCEDURE — 86140 C-REACTIVE PROTEIN: CPT

## 2021-03-14 PROCEDURE — 74011250637 HC RX REV CODE- 250/637: Performed by: FAMILY MEDICINE

## 2021-03-14 PROCEDURE — 36415 COLL VENOUS BLD VENIPUNCTURE: CPT

## 2021-03-14 PROCEDURE — 85379 FIBRIN DEGRADATION QUANT: CPT

## 2021-03-14 PROCEDURE — 74011636637 HC RX REV CODE- 636/637: Performed by: FAMILY MEDICINE

## 2021-03-14 PROCEDURE — 74011250636 HC RX REV CODE- 250/636: Performed by: FAMILY MEDICINE

## 2021-03-14 PROCEDURE — 80053 COMPREHEN METABOLIC PANEL: CPT

## 2021-03-14 PROCEDURE — 74011000250 HC RX REV CODE- 250: Performed by: FAMILY MEDICINE

## 2021-03-14 PROCEDURE — 85027 COMPLETE CBC AUTOMATED: CPT

## 2021-03-14 PROCEDURE — 93306 TTE W/DOPPLER COMPLETE: CPT

## 2021-03-14 RX ADMIN — DEXAMETHASONE 6 MG: 4 TABLET ORAL at 09:50

## 2021-03-14 RX ADMIN — Medication 10 ML: at 06:41

## 2021-03-14 RX ADMIN — INSULIN GLARGINE 10 UNITS: 100 INJECTION, SOLUTION SUBCUTANEOUS at 21:41

## 2021-03-14 RX ADMIN — FLUTICASONE FUROATE 1 PUFF: 200 POWDER RESPIRATORY (INHALATION) at 09:51

## 2021-03-14 RX ADMIN — Medication 2000 UNITS: at 09:50

## 2021-03-14 RX ADMIN — FAMOTIDINE 20 MG: 20 TABLET ORAL at 21:41

## 2021-03-14 RX ADMIN — INSULIN LISPRO 15 UNITS: 100 INJECTION, SOLUTION INTRAVENOUS; SUBCUTANEOUS at 13:03

## 2021-03-14 RX ADMIN — INSULIN LISPRO 15 UNITS: 100 INJECTION, SOLUTION INTRAVENOUS; SUBCUTANEOUS at 16:44

## 2021-03-14 RX ADMIN — Medication 10 ML: at 13:04

## 2021-03-14 RX ADMIN — ATORVASTATIN CALCIUM 10 MG: 10 TABLET, FILM COATED ORAL at 21:41

## 2021-03-14 RX ADMIN — ENOXAPARIN SODIUM 40 MG: 100 INJECTION SUBCUTANEOUS at 23:13

## 2021-03-14 RX ADMIN — LISINOPRIL 5 MG: 5 TABLET ORAL at 09:50

## 2021-03-14 RX ADMIN — FAMOTIDINE 20 MG: 20 TABLET ORAL at 09:50

## 2021-03-14 RX ADMIN — INSULIN GLARGINE 10 UNITS: 100 INJECTION, SOLUTION SUBCUTANEOUS at 09:00

## 2021-03-14 RX ADMIN — INSULIN LISPRO 15 UNITS: 100 INJECTION, SOLUTION INTRAVENOUS; SUBCUTANEOUS at 21:50

## 2021-03-14 RX ADMIN — INSULIN LISPRO 12 UNITS: 100 INJECTION, SOLUTION INTRAVENOUS; SUBCUTANEOUS at 06:40

## 2021-03-14 RX ADMIN — ENOXAPARIN SODIUM 40 MG: 100 INJECTION SUBCUTANEOUS at 10:08

## 2021-03-14 RX ADMIN — LEVOTHYROXINE SODIUM 50 MCG: 0.05 TABLET ORAL at 06:40

## 2021-03-14 RX ADMIN — Medication 10 ML: at 23:13

## 2021-03-14 RX ADMIN — REMDESIVIR 100 MG: 100 INJECTION, POWDER, LYOPHILIZED, FOR SOLUTION INTRAVENOUS at 04:52

## 2021-03-14 NOTE — PROGRESS NOTES
Chart reviewed as CM on call. Pt admitted to tele by hospitalist for hypoxia, covid+. CM will be following for transition of care needs and will be available via hospital  on weekends. Reason for Admission:  Per H&P pt is \"is a 62 y.o. male with obesity, type 2 diabetes mellitus, and hypertension who presents to the ED with worsening cough and shortness of breath. He started getting sick and tested positive for COVID-19 on 3/7. Has also has had fever. Denies chest pain, diarrhea, nausea, or vomiting. He works at [employer]. \"                     RUR Score:    11%                 Plan for utilizing home health:      TBD, Provider if [place] needed please place order    PCP: First and Last name:  Tayo Colunga MD     Name of Practice:    Are you a current patient: Yes/No:    Approximate date of last visit:    Can you participate in a virtual visit with your PCP:                     Current Advanced Directive/Advance Care Plan: Full Code      Healthcare Decision Maker:   Click here to complete [place] including selection of the Healthcare Decision Maker Relationship (ie \"Primary\")                             Transition of Care Plan:   TBD

## 2021-03-14 NOTE — PROGRESS NOTES
1930: Report received from UMM Egan RN. Assumed care of pt at this time. 2202: Head to toe assessment completed. Patient is 3L NC. Alert and oriented x4. Up ad rusty.     0723:   Shift change report given to UMM Verma (oncoming nurse) by Albert COREAS RN (offgoing nurse). Report included the following information SBAR, Kardex and MAR. Shift summary: Patient had uneventful shift.

## 2021-03-14 NOTE — PROGRESS NOTES
Hospitalist Progress Note    Patient: Jason Delgado MRN: [id number]  CSN: [id number]    YOB: 1960  Age: 62 y.o. Sex: male    DOA: 3/12/2021 LOS:  LOS: 2 days          Chief Complaint:     male with obesity, type 2 diabetes mellitus, and hypertension is admitted with hypoxia due to COVID-19 pneumonia. He has multiple comorbidities which worsen his chance of having a severe disease course.       Getting echo and duplex done  Had good night but needed a little more oxygen   Now up to 3liters    Assessment/Plan     Hypoxia due to COVID-19 pneumonia  -Nasal cannula oxygen as needed  -Dexamethasone protocol  -Remdesivir  -Convalescent plasma  --Infectious disease consult  --Prone positioning as tolerated     Hypertension  -Continue home medications     Type 2 diabetes mellitus  -Diabetic diet  -Sliding scale insulin  --Continue lantus at 10 units twice daily (normally on 15 units twice daily)  -Follow-up hemoglobin A1c     Obesity-BMI 31    Patient Active Problem List   Diagnosis Code    Hypoxia R09.02    Pneumonia due to COVID-19 virus U07.1, J12.82    Obesity (BMI 30-39. 9) E66.9    [facility] E11.[facility]    Hypertension I10       Subjective:        Review of systems:    Constitutional: denies fevers, chills, myalgias  Respiratory: +s SOB, cough  Cardiovascular: denies chest pain, palpitations  Gastrointestinal: denies nausea, vomiting, diarrhea      Vital signs/Intake and Output:  Visit Vitals  /71 (BP 1 Location: Left upper arm, BP Patient Position: At rest)   Pulse (!) 57   Temp 97.7 °F (36.5 °C)   Resp 17   Ht 5' 9\" (1.753 m)   Wt 96.2 kg (212 lb)   SpO2 90%   BMI 31.31 kg/m²     Current Shift:  No intake/output data recorded. Last three shifts:  03/12 1901 - 03/14 0700  In: 510 [P.O.:240;  I.V.:270]  Out: -     Exam:    General: Well developed, alert, NAD, OX3  Head/Neck: NCAT, supple, No masses, No lymphadenopathy  CVS:Regular rate and rhythm, no M/R/G, S1/S2 heard, no thrill  Lungs:Clear to auscultation bilaterally, no wheezes, rhonchi, or rales  Abdomen: Soft, Nontender, No distention, Normal Bowel sounds, No hepatomegaly  Extremities: No C/C/E, pulses palpable 2+  Skin:normal texture and turgor, no rashes, no lesions  Neuro:grossly normal , follows commands  Psych:appropriate                Labs: Results:       Chemistry Recent Labs     03/14/21  0706 03/13/21  0553 03/12/21 2000   * 420* 328*   * 132* 136   K 4.6 4.5 4.1   CL 99* 96* 99*   CO2 27 25 26   BUN 44* 32* 23*   CREA 1.37* 1.39* 1.21   CA 9.0 9.1 9.5   AGAP 7 11 11   BUCR 32* 23* 19   AP 98 96 98   TP 6.0* 6.5 6.7   ALB 2.7* 3.0* 3.1*   GLOB 3.3 3.5 3.6   AGRAT 0.8 0.9 0.9      CBC w/Diff Recent Labs     03/14/21  0706 03/13/21  0553 03/12/21 2000   WBC 12.8 4.3* 6.4   RBC 3.91* 4.20* 4.36*   HGB 12.8* 13.6 14.2   HCT 36.5 39.5 40.9    210 221   GRANS  --   --  84*   LYMPH  --   --  12*   EOS  --   --  0      Cardiac Enzymes Recent Labs     03/12/21 2000   CPK 42   CKND1 CALCULATION NOT PERFORMED WHEN RESULT IS BELOW LINEAR LIMIT      Coagulation Recent Labs     03/12/21 2000   PTP 14.5   INR 1.1   APTT 43.4*       Lipid Panel No results found for: CHOL, CHOLPOCT, CHOLX, CHLST, CHOLV, 097729, HDL, HDLP, LDL, LDLC, DLDLP, 833847, VLDLC, VLDL, TGLX, TRIGL, TRIGP, TGLPOCT, CHHD, CHHDX   BNP No results for input(s): BNPP in the last 72 hours.    Liver Enzymes Recent Labs     03/14/21 0706   TP 6.0*   ALB 2.7*   AP 98      Thyroid Studies No results found for: T4, T3U, TSH, TSHEXT, TSHEXT     Procedures/imaging: see electronic medical records for all procedures/Xrays and details which were not copied into this note but were reviewed prior to creation of Nayana Kumar MD

## 2021-03-15 LAB
ALBUMIN SERPL-MCNC: 2.7 G/DL (ref 3.4–5)
ALBUMIN/GLOB SERPL: 0.9 {RATIO} (ref 0.8–1.7)
ALP SERPL-CCNC: 85 U/L (ref 45–117)
ALT SERPL-CCNC: 21 U/L (ref 16–61)
ANION GAP SERPL CALC-SCNC: 6 MMOL/L (ref 3–18)
AST SERPL-CCNC: 18 U/L (ref 10–38)
BASOPHILS # BLD: 0 K/UL (ref 0–0.1)
BASOPHILS NFR BLD: 0 % (ref 0–2)
BILIRUB SERPL-MCNC: 0.3 MG/DL (ref 0.2–1)
BUN SERPL-MCNC: 39 MG/DL (ref 7–18)
BUN/CREAT SERPL: 32 (ref 12–20)
CALCIUM SERPL-MCNC: 8.8 MG/DL (ref 8.5–10.1)
CHLORIDE SERPL-SCNC: 103 MMOL/L (ref 100–111)
CO2 SERPL-SCNC: 28 MMOL/L (ref 21–32)
CREAT SERPL-MCNC: 1.21 MG/DL (ref 0.6–1.3)
DIFFERENTIAL METHOD BLD: ABNORMAL
EOSINOPHIL # BLD: 0 K/UL (ref 0–0.4)
EOSINOPHIL NFR BLD: 0 % (ref 0–5)
ERYTHROCYTE [DISTWIDTH] IN BLOOD BY AUTOMATED COUNT: 11.5 % (ref 11.6–14.5)
GLOBULIN SER CALC-MCNC: 2.9 G/DL (ref 2–4)
GLUCOSE BLD STRIP.AUTO-MCNC: 185 MG/DL (ref 70–110)
GLUCOSE BLD STRIP.AUTO-MCNC: 273 MG/DL (ref 70–110)
GLUCOSE BLD STRIP.AUTO-MCNC: 315 MG/DL (ref 70–110)
GLUCOSE BLD STRIP.AUTO-MCNC: 323 MG/DL (ref 70–110)
GLUCOSE SERPL-MCNC: 183 MG/DL (ref 74–99)
HCT VFR BLD AUTO: 34.8 % (ref 36–48)
HGB BLD-MCNC: 12.6 G/DL (ref 13–16)
LYMPHOCYTES # BLD: 0.9 K/UL (ref 0.9–3.6)
LYMPHOCYTES NFR BLD: 7 % (ref 21–52)
MCH RBC QN AUTO: 33.8 PG (ref 24–34)
MCHC RBC AUTO-ENTMCNC: 36.2 G/DL (ref 31–37)
MCV RBC AUTO: 93.3 FL (ref 74–97)
MONOCYTES # BLD: 0.7 K/UL (ref 0.05–1.2)
MONOCYTES NFR BLD: 5 % (ref 3–10)
NEUTS SEG # BLD: 12.4 K/UL (ref 1.8–8)
NEUTS SEG NFR BLD: 88 % (ref 40–73)
PLATELET # BLD AUTO: 290 K/UL (ref 135–420)
PMV BLD AUTO: 10.6 FL (ref 9.2–11.8)
POTASSIUM SERPL-SCNC: 4.5 MMOL/L (ref 3.5–5.5)
PROT SERPL-MCNC: 5.6 G/DL (ref 6.4–8.2)
RBC # BLD AUTO: 3.73 M/UL (ref 4.7–5.5)
SODIUM SERPL-SCNC: 137 MMOL/L (ref 136–145)
WBC # BLD AUTO: 14 K/UL (ref 4.6–13.2)

## 2021-03-15 PROCEDURE — 77010033678 HC OXYGEN DAILY

## 2021-03-15 PROCEDURE — 74011250636 HC RX REV CODE- 250/636: Performed by: FAMILY MEDICINE

## 2021-03-15 PROCEDURE — 36415 COLL VENOUS BLD VENIPUNCTURE: CPT

## 2021-03-15 PROCEDURE — XW043E5 INTRODUCTION OF REMDESIVIR ANTI-INFECTIVE INTO CENTRAL VEIN, PERCUTANEOUS APPROACH, NEW TECHNOLOGY GROUP 5: ICD-10-PCS | Performed by: FAMILY MEDICINE

## 2021-03-15 PROCEDURE — 80053 COMPREHEN METABOLIC PANEL: CPT

## 2021-03-15 PROCEDURE — 74011250637 HC RX REV CODE- 250/637: Performed by: HOSPITALIST

## 2021-03-15 PROCEDURE — 74011636637 HC RX REV CODE- 636/637: Performed by: HOSPITALIST

## 2021-03-15 PROCEDURE — 36430 TRANSFUSION BLD/BLD COMPNT: CPT

## 2021-03-15 PROCEDURE — 74011250637 HC RX REV CODE- 250/637: Performed by: FAMILY MEDICINE

## 2021-03-15 PROCEDURE — 74011000258 HC RX REV CODE- 258: Performed by: FAMILY MEDICINE

## 2021-03-15 PROCEDURE — 82962 GLUCOSE BLOOD TEST: CPT

## 2021-03-15 PROCEDURE — 65660000000 HC RM CCU STEPDOWN

## 2021-03-15 PROCEDURE — 85025 COMPLETE CBC W/AUTO DIFF WBC: CPT

## 2021-03-15 PROCEDURE — 74011636637 HC RX REV CODE- 636/637: Performed by: FAMILY MEDICINE

## 2021-03-15 PROCEDURE — 74011636637 HC RX REV CODE- 636/637: Performed by: INTERNAL MEDICINE

## 2021-03-15 PROCEDURE — XW13325 TRANSFUSION OF CONVALESCENT PLASMA (NONAUTOLOGOUS) INTO PERIPHERAL VEIN, PERCUTANEOUS APPROACH, NEW TECHNOLOGY GROUP 5: ICD-10-PCS | Performed by: FAMILY MEDICINE

## 2021-03-15 PROCEDURE — 74011000250 HC RX REV CODE- 250: Performed by: FAMILY MEDICINE

## 2021-03-15 RX ORDER — INSULIN LISPRO 100 [IU]/ML
10 INJECTION, SOLUTION INTRAVENOUS; SUBCUTANEOUS
Status: DISCONTINUED | OUTPATIENT
Start: 2021-03-15 | End: 2021-03-18 | Stop reason: HOSPADM

## 2021-03-15 RX ORDER — AMOXICILLIN 250 MG
1 CAPSULE ORAL DAILY
Status: DISCONTINUED | OUTPATIENT
Start: 2021-03-15 | End: 2021-03-18 | Stop reason: HOSPADM

## 2021-03-15 RX ADMIN — LEVOTHYROXINE SODIUM 50 MCG: 0.05 TABLET ORAL at 07:12

## 2021-03-15 RX ADMIN — Medication 10 ML: at 14:46

## 2021-03-15 RX ADMIN — INSULIN LISPRO 3 UNITS: 100 INJECTION, SOLUTION INTRAVENOUS; SUBCUTANEOUS at 06:45

## 2021-03-15 RX ADMIN — ENOXAPARIN SODIUM 40 MG: 100 INJECTION SUBCUTANEOUS at 11:38

## 2021-03-15 RX ADMIN — ATORVASTATIN CALCIUM 10 MG: 10 TABLET, FILM COATED ORAL at 22:45

## 2021-03-15 RX ADMIN — INSULIN LISPRO 12 UNITS: 100 INJECTION, SOLUTION INTRAVENOUS; SUBCUTANEOUS at 22:43

## 2021-03-15 RX ADMIN — FAMOTIDINE 20 MG: 20 TABLET ORAL at 10:03

## 2021-03-15 RX ADMIN — INSULIN LISPRO 12 UNITS: 100 INJECTION, SOLUTION INTRAVENOUS; SUBCUTANEOUS at 17:23

## 2021-03-15 RX ADMIN — INSULIN GLARGINE 10 UNITS: 100 INJECTION, SOLUTION SUBCUTANEOUS at 22:45

## 2021-03-15 RX ADMIN — DEXAMETHASONE 6 MG: 4 TABLET ORAL at 10:03

## 2021-03-15 RX ADMIN — REMDESIVIR 100 MG: 100 INJECTION, POWDER, LYOPHILIZED, FOR SOLUTION INTRAVENOUS at 04:00

## 2021-03-15 RX ADMIN — Medication 10 ML: at 06:46

## 2021-03-15 RX ADMIN — INSULIN LISPRO 10 UNITS: 100 INJECTION, SOLUTION INTRAVENOUS; SUBCUTANEOUS at 17:24

## 2021-03-15 RX ADMIN — Medication 10 ML: at 22:46

## 2021-03-15 RX ADMIN — SENNOSIDES AND DOCUSATE SODIUM 1 TABLET: 8.6; 5 TABLET ORAL at 13:16

## 2021-03-15 RX ADMIN — ENOXAPARIN SODIUM 40 MG: 100 INJECTION SUBCUTANEOUS at 22:46

## 2021-03-15 RX ADMIN — FAMOTIDINE 20 MG: 20 TABLET ORAL at 22:45

## 2021-03-15 RX ADMIN — LISINOPRIL 5 MG: 5 TABLET ORAL at 10:03

## 2021-03-15 RX ADMIN — Medication 2000 UNITS: at 10:03

## 2021-03-15 RX ADMIN — INSULIN GLARGINE 10 UNITS: 100 INJECTION, SOLUTION SUBCUTANEOUS at 10:02

## 2021-03-15 RX ADMIN — INSULIN LISPRO 9 UNITS: 100 INJECTION, SOLUTION INTRAVENOUS; SUBCUTANEOUS at 11:39

## 2021-03-15 NOTE — ROUTINE PROCESS
This RN accessed pt chart to verify passcode for family member.  Information passed along to staff RN

## 2021-03-15 NOTE — CONSULTS
[facility]  (A Division of [address] [place] [address])      Consultation Note      Date of Admission: 3/12/2021    Date of Note: 3/15/2021    Referred by: Owen Gillis MD    Reason for Referral: COVID-19      Current Antimicrobials:    Prior Antimicrobials:  1. remdesivir IV (3/13-) #2        Assessment: Rec / Plan:   IURDF-53 pneumonia/hypoxia  On good meds right now; keep pushing. Was receiving his convalescent plasma this afternoon. ->Dexamethasone #3/10, Remdesivir #2/5, and convalescent plasma (3/15)   DMT2 poor control A1c 12%    HTN    JOSE LUIS - better    Obesity BMI 31          Microbiology:  3/12 - BCx x2 (-)       3/7 - COVID-19 (+)      Lines / Catheters: peripheral      HPI:  CC:  \"I feel better\"  Mr Reta Zamudio is a  61y poorly controlled diabetic WM who was well until 3/7 with onset of fevers/malaise for which he went to the ED and tested (+) Covid-19. Sent home as there was no hypoxia/instability. Became progressively weaker/dyspneic/dry cough for which he was admitted 3/12. Received steroids/remdesivir over the weekend and feels better today. No anosmia or diarrhea.     [employer] [facility]    3/7 - COVID-19 (+)  Dexamethasone (3/12-)  Remdesivir (3/13-)    ABO O-      20  [id number]  [id number] Transfusing  03/15/21 1109         Active Hospital Problems    Diagnosis Date Noted    Pneumonia due to COVID-19 virus 03/13/2021    Obesity (BMI 30-39.9) 03/13/2021    Type 2 diabetes mellitus ([address] 03/13/2021    Hypertension 03/13/2021    Hypoxia 03/12/2021     Past Medical History:   Diagnosis Date    Diabetes ([address]     Hashimoto's disease     High cholesterol     Hypertension     Obesity (BMI 30-39.9) 3/13/2021     Past Surgical History:   Procedure Laterality Date    HX KNEE ARTHROSCOPY       Family History   Problem Relation Age of Onset    Diabetes Mother     Heart Disease Mother     Diabetes Father     Diabetes Sister     Diabetes Brother     Heart Disease Maternal Grandfather      Social History     Socioeconomic History    Marital status: SINGLE     Spouse name: Not on file    Number of children: Not on file    Years of education: Not on file    Highest education level: Not on file   Occupational History    Not on file   Social Needs    Financial resource strain: Not on file    Food insecurity     Worry: Not on file     Inability: Not on file    Transportation needs     Medical: Not on file     Non-medical: Not on file   Tobacco Use    Smoking status: Never Smoker    Smokeless tobacco: Never Used   Substance and Sexual Activity    Alcohol use: No    Drug use: Not on file    Sexual activity: Not on file   Lifestyle    Physical activity     Days per week: Not on file     Minutes per session: Not on file    Stress: Not on file   Relationships    Social connections     Talks on phone: Not on file     Gets together: Not on file     Attends Cheondoism service: Not on file     Active member of club or organization: Not on file     Attends meetings of clubs or organizations: Not on file     Relationship status: Not on file    Intimate partner violence     Fear of current or ex partner: Not on file     Emotionally abused: Not on file     Physically abused: Not on file     Forced sexual activity: Not on file   Other Topics Concern     Service Not Asked    Blood Transfusions Not Asked    Caffeine Concern Not Asked    Occupational Exposure Not Asked   Mindy Delgadillo Hazards Not Asked    Sleep Concern Not Asked    Stress Concern Not Asked    Weight Concern Not Asked    Special Diet Not Asked    Back Care Not Asked    Exercise Not Asked    Bike Helmet Not Asked   [address] Not Asked    Self-Exams Not Asked   Social History Narrative    Not on file       Allergies:  Glipizide and Metformin     Medications:  Current Facility-Administered Medications   Medication Dose Route Frequency    senna-docusate (PERICOLACE) 8.6-50 mg per tablet 1 Tab  1 Tab Oral DAILY    insulin lispro (HUMALOG) injection 10 Units  10 Units SubCUTAneous TIDAC    atorvastatin (LIPITOR) tablet 10 mg  10 mg Oral QHS    lisinopriL (PRINIVIL, ZESTRIL) tablet 5 mg  5 mg Oral DAILY    levothyroxine (SYNTHROID) tablet 50 mcg  50 mcg Oral ACB    0.9% sodium chloride infusion 250 mL  250 mL IntraVENous PRN    insulin glargine (LANTUS) injection 10 Units  10 Units SubCUTAneous BID    fluticasone furoate (ARNUITY ELLIPTA) 200 mcg/puff  1 Puff Inhalation DAILY    sodium chloride (NS) flush 5-10 mL  5-10 mL IntraVENous PRN    sodium chloride (NS) flush 5-40 mL  5-40 mL IntraVENous Q8H    sodium chloride (NS) flush 5-40 mL  5-40 mL IntraVENous PRN    acetaminophen (TYLENOL) tablet 650 mg  650 mg Oral Q6H PRN    Or    acetaminophen (TYLENOL) suppository 650 mg  650 mg Rectal Q6H PRN    polyethylene glycol (MIRALAX) packet 17 g  17 g Oral DAILY PRN    promethazine (PHENERGAN) tablet 12.5 mg  12.5 mg Oral Q6H PRN    Or    ondansetron (ZOFRAN) injection 4 mg  4 mg IntraVENous Q6H PRN    enoxaparin (LOVENOX) injection 40 mg  40 mg SubCUTAneous Q12H    guaiFENesin-dextromethorphan (ROBITUSSIN DM) 100-10 mg/5 mL syrup 5 mL  5 mL Oral Q4H PRN    dexAMETHasone (DECADRON) tablet 6 mg  6 mg Oral DAILY    cholecalciferol (VITAMIN D3) (1000 Units /25 mcg) tablet 2,000 Units  2,000 Units Oral DAILY    insulin lispro (HUMALOG) injection   SubCUTAneous AC&HS    glucose chewable tablet 16 g  16 g Oral PRN    glucagon (GLUCAGEN) injection 1 mg  1 mg IntraMUSCular PRN    dextrose 10% infusion 125-250 mL  125-250 mL IntraVENous PRN    famotidine (PEPCID) tablet 20 mg  20 mg Oral BID    remdesivir 100 mg in 0.9% sodium chloride 250 mL IVPB  100 mg IntraVENous Q24H        ROS:  Constitutional: positive for fevers, fatigue, malaise and anorexia, negative for chills and sweats  Ears, Nose, Mouth, Throat, and Face: negative for anosmia  Respiratory: positive for cough or dyspnea on exertion, negative for sputum  Cardiovascular: positive for dyspnea, fatigue, dyspnea on exertion, negative for chest pain  Gastrointestinal: negative for nausea, vomiting, diarrhea and abdominal pain     Physical Exam:    HPI:  Temp (24hrs), Av °F (36.7 °C), Min:97.5 °F (36.4 °C), Max:98.5 °F (36.9 °C)    Visit Vitals  /75   Pulse 67   Temp 98.5 °F (36.9 °C)   Resp 16   Ht 5' 9\" (1.753 m)   Wt 96.2 kg (212 lb)   SpO2 96%   BMI 31.31 kg/m²       General: Well developed, well nourished 62 y.o. WHITE male in no acute distress. ENT: ENT exam normal, no neck nodes or sinus tenderness  Head: normocephalic, without obvious abnormality  Mouth:  mucous membranes moist, pharynx normal without lesions  Neck: supple, symmetrical, trachea midline   Cardio:  regular rate and rhythm, S1, S2 normal, no murmur, click, rub or gallop  Chest: inspection normal - no chest wall deformities or tenderness, respiratory effort normal  Lungs: clear to auscultation, no wheezes or rales and unlabored breathing  Abdomen: soft, non-tender. Bowel sounds normal. No masses, no organomegaly.   Extremities:  no redness or tenderness in the calves or thighs, no edema, no acral ischemia  Neuro: Grossly normal       Lab results:    Chemistry  Recent Labs     03/15/21  0630 21  0706 21  0553   * 321* 420*    133* 132*   K 4.5 4.6 4.5    99* 96*   CO2 28 27 25   BUN 39* 44* 32*   CREA 1.21 1.37* 1.39*   CA 8.8 9.0 9.1   AGAP 6 7 11   BUCR 32* 32* 23*   AP 85 98 96   TP 5.6* 6.0* 6.5   ALB 2.7* 2.7* 3.0*   GLOB 2.9 3.3 3.5   AGRAT 0.9 0.8 0.9       CBC w/ Diff  Recent Labs     03/15/21  0630 21  0706 21  0553 21   WBC 14.0* 12.8 4.3* 6.4   RBC 3.73* 3.91* 4.20* 4.36*   HGB 12.6* 12.8* 13.6 14.2   HCT 34.8* 36.5 39.5 40.9    269 210 221   GRANS 88*  --   --  84*   LYMPH 7*  --   --  12*   EOS 0  --   --  0       Microbiology  All Micro Results     Procedure Component Value Units Date/Time    CULTURE, BLOOD [[id number]] Collected: 03/12/21 2209    Order Status: Completed Specimen: Blood Updated: 03/15/21 0832     Special Requests: NO SPECIAL REQUESTS        Culture result: NO GROWTH 3 DAYS       CULTURE, BLOOD [[id number]] Collected: 03/12/21 2200    Order Status: Completed Specimen: Blood Updated: 03/15/21 0832     Special Requests: NO SPECIAL REQUESTS        Culture result: NO GROWTH 3 DAYS              Regina Syed MD  Cell [phone]  3/15/2021   2:46 PM

## 2021-03-15 NOTE — DIABETES MGMT
GLYCEMIC CONTROL PLAN OF CARE        Diabetes Management:      Assessment: known h/o DM2, HbA1C not within recommended range for age + comorbids on basal/bolus home regimen  - admitted for Covid pneumonia    BG in target range (non-ICU\" < 180 ; -180):  [] Yes  [x] No      Steroids: decadron 6 mg, steroid associated hyperglycemia    TDD previous day = 77 (20 Lantus + 57 units Humalog Very Insulin Resistant Corrective Coverage)    Recommend: initiate mealtime insulin to address PPG excursions if steroids to continue   *Humalog 10 units qac    Addendum;  - pt confirmed to this writer Levemir only home regimen  - humalog was discontinued last year    -pt needs glucometer to monitor BG at home  -the script should read  -Preferred Glucometer kit per insurance   -3 test strips/3 lancets per day  -300 test strips/300 lancets per month          - recommend adjustment to home regimen r/t current A1C  - pt would benefit from Humalog mealtime insulin    Most recent blood glucose results:   Lab Results   Component Value Date/Time     (H) 03/15/2021 06:30 AM    GLUCPOC 185 (H) 03/15/2021 06:41 AM    GLUCPOC 359 (H) 03/14/2021 09:45 PM    GLUCPOC 369 (H) 03/14/2021 03:57 PM         Hypo: No    HbA1C: equivalent  to ave BGlucose of 300 mg/dl for 2-3 months prior to admission  Lab Results   Component Value Date/Time    Hemoglobin A1c 12.0 (H) 03/13/2021 05:53 AM       Adequate glycemic control PTA:  [] Yes  [x] No      Home diabetes medications:  Key Antihyperglycemic Medications             insulin detemir (LEVEMIR) 100 unit/mL injection by SubCUTAneous route nightly. insulin lispro (HUMALOG) 100 unit/mL injection by SubCUTAneous route.         Goals:  Blood glucose will be within target range of 70 - 180 mg/dL by: 3/30    Diet:   Active Orders   Diet    DIET DIABETIC CONSISTENT CARB Regular       Education:  __X___ Refer to Diabetes Education Record                       _____ Education not indicated at this time      Ginna Ospina MS, RN, CDE  Glycemic Control Team  [phone]  Pager [phone] (M-TH 8:00-4:30P)  *After Hours pager [phone]

## 2021-03-15 NOTE — DIABETES MGMT
Diabetes Patient/Family Education Record  Factors That  May Influence Patients Ability  to Learn or  Comply with Recommendations   []   Language barrier    []   Cultural needs   []   Motivation    []   Cognitive limitation    []   Physical   []   Education    []   Physiological factors   []   Hearing/vision/speaking impairment   []   Voodoo beliefs    []   Financial factors   [x]  Other: stress at work   []  No factors identified at this time.      Person Instructed:   [x]   Patient   []   Family   []  Other     Preference for Learning:   [x]   Verbal   []   Written   []  Demonstration     Level of Comprehension & Competence:    [x]  Good                                      [] Fair                                     []  Poor                             []  Needs Reinforcement   [x]  Teachback completed    Education Component:   [x]  Medication management, including how to administer insulin (if appropriate) and potential medication interactions; PTA corrected   []  Nutritional management -obtain usual meal pattern   []  Exercise   []  Signs, symptoms, and treatment of hyperglycemia and hypoglycemia   [] Prevention, recognition and treatment of hyperglycemia and hypoglycemia   [x]  Importance of blood glucose monitoring and how to obtain a blood glucose meter; glucometer is broken pt will need prescription for new glucometer kit   []  Instruction on use of the blood glucose meter   [x]  Discuss the importance of HbA1C monitoring    []  Sick day guidelines   []  Proper use and disposal of lancets, needles, syringes or insulin pens (if appropriate)   [x]  Potential long-term complications (retinopathy, kidney disease, neuropathy, foot care)   [] Information about whom to contact in case of emergency or for more information    [x]  Goal:  Patient/family will demonstrate understanding of Diabetes Self Management Skills by: (date) ___4/30____  Plan for post-discharge education or self-management support:    [] Outpatient class schedule provided            [] Patient Declined    [] Scheduled for outpatient classes (date) _______  Verify:  Does patient understand how diabetes medications work? _______yes_____________________  Does patient know what their most recent A1c is? ______________yes_____________________  Does patient monitor glucose at home? _____________________no______________________  Does patient have difficulty obtaining diabetes medications or testing supplies? _______no__________         Ginna Ospina MS, RN, CDE  Glycemic Control Team  [phone]  Pager [phone] (M-TH 8:00-4:30P)  *After Hours pager [phone]

## 2021-03-15 NOTE — PROGRESS NOTES
Discharge Planning: Home with family assist and MD follow-up once medically stable. Chart reviewed. CM spoke with the patient and informed him of the CM's role. The patient confirmed demographics and PCP. The patient states that he goes to the [facility] clinic for primary care but can't remember the name of the last MD he saw there. CM and the patient discussed discharge plans to include transportation upon discharge, DME, family support, and transportation to and from appointments. The patient states that he lives with his younger brother and that prior to this admission he was independent, driving himself places and not requiring any DME for ambulation. The patient states that his brother, Marcy Stone, will be available to pick him up when he is discharged. The patient denies any additional questions or concerns at this time. CM to follow the patient's progress and be available to assist with discharge planning as needed. CMS referral placed. Reason for Admission: Hypoxia    Chart reviewed. Per H&P: Letty Chaudhari is a 62 y.o. male with obesity, type 2 diabetes mellitus, and hypertension who presents to the ED with worsening cough and shortness of breath. He started getting sick and tested positive for COVID-19 on 3/7. Has also has had fever. Denies chest pain, diarrhea, nausea, or vomiting. He works at [employer]. \"    Prior to admission patient was living: With younger brother    Prior to admission patient was using the following DME: None                     RUR Score: 11%                   Plan for utilizing home health: TBD         PCP: First and Last name: Kaitlynn Frances MD    Name of Practice: [facility]   Are you a current patient: Yes/No: Yes   Approximate date of last visit: November 2020   Can you participate in a virtual visit with your PCP:                     Current Advanced Directive/Advance Care Plan: Full Code    Healthcare Decision Maker: Lamberto Sevilla Transition of Care Plan: In progress, likely home with family assist and MD follow-up

## 2021-03-15 NOTE — PROGRESS NOTES
Problem: Falls - Risk of  Goal: *Absence of Falls  Description: Document Letitia Weston Fall Risk and appropriate interventions in the flowsheet.   Outcome: Progressing Towards Goal  Note: Fall Risk Interventions:            Medication Interventions: Evaluate medications/consider consulting pharmacy, Patient to call before getting OOB

## 2021-03-15 NOTE — ROUTINE PROCESS
Bedside and Verbal shift change report given to [facility], RN (oncoming nurse) by SHEFALI Bermudez RN (offgoing nurse). Report included the following information SBAR, Kardex, Intake/Output, MAR and Recent Results.

## 2021-03-15 NOTE — PROGRESS NOTES
Hospitalist Progress Note    Patient: Mima Todd MRN: [id number]  CSN: [id number]    YOB: 1960  Age: 62 y.o. Sex: male    DOA: 3/12/2021 LOS:  LOS: 3 days                Assessment/Plan     Patient Active Problem List   Diagnosis Code    Hypoxia R09.02    Pneumonia due to COVID-19 virus U07.1, J12.82    Obesity (BMI 30-39. 9) E66.9    Type 2 diabetes mellitus ([facility] [address] [facility] E11.9    Hypertension I10            61 yo male with type 2 diabetes mellitus, and hypertension is admitted with hypoxia due to COVID-19 pneumonia. Feels better    COVID 19 pneumonia -  On oxygen, wean as tolerated. Vitamin/antioxidant cocktail. Dexamethasone  remdesivir  Convalescent plasma  lovenox  proning  ID consulted. DM -  Continue with lantus, pre meal and SSI      HTN -  Controlled on home meds. Disposition : 2-3 days    Review of systems  General: No fevers or chills. Cardiovascular: No chest pain or pressure. No palpitations. Pulmonary: see above. Gastrointestinal: No nausea, vomiting. Physical Exam:  General: Awake, cooperative, no acute distress    HEENT: NC, Atraumatic. PERRLA, anicteric sclerae. Lungs: CTA Bilaterally. No Wheezing/Rhonchi/Rales. Heart:  S1 S2,  No murmur, No Rubs, No Gallops  Abdomen: Soft, Non distended, Non tender.  +Bowel sounds,   Extremities: No c/c/e  Psych:   Not anxious or agitated. Neurologic:  No acute neurological deficit. Vital signs/Intake and Output:  Visit Vitals  /75   Pulse 67   Temp 98.5 °F (36.9 °C)   Resp 16   Ht 5' 9\" (1.753 m)   Wt 96.2 kg (212 lb)   SpO2 96%   BMI 31.31 kg/m²     Current Shift:  No intake/output data recorded. Last three shifts:  No intake/output data recorded.             Labs: Results:       Chemistry Recent Labs     03/15/21  0630 03/14/21  0706 03/13/21  0553   * 321* 420*    133* 132*   K 4.5 4.6 4.5    99* 96*   CO2 28 27 25   BUN 39* 44* 32*   CREA 1.21 1.37* 1.39*   CA 8.8 9.0 9.1   AGAP 6 7 11   BUCR 32* 32* 23*   AP 85 98 96   TP 5.6* 6.0* 6.5   ALB 2.7* 2.7* 3.0*   GLOB 2.9 3.3 3.5   AGRAT 0.9 0.8 0.9      CBC w/Diff Recent Labs     03/15/21  0630 03/14/21  0706 03/13/21  0553 03/12/21 2000   WBC 14.0* 12.8 4.3* 6.4   RBC 3.73* 3.91* 4.20* 4.36*   HGB 12.6* 12.8* 13.6 14.2   HCT 34.8* 36.5 39.5 40.9    269 210 221   GRANS 88*  --   --  84*   LYMPH 7*  --   --  12*   EOS 0  --   --  0      Cardiac Enzymes Recent Labs     03/12/21 2000   CPK 42   CKND1 CALCULATION NOT PERFORMED WHEN RESULT IS BELOW LINEAR LIMIT      Coagulation Recent Labs     03/12/21 2000   PTP 14.5   INR 1.1   APTT 43.4*       Lipid Panel No results found for: CHOL, CHOLPOCT, CHOLX, CHLST, CHOLV, 212595, HDL, HDLP, LDL, LDLC, DLDLP, 866341, VLDLC, VLDL, TGLX, TRIGL, TRIGP, TGLPOCT, CHHD, CHHDX   BNP No results for input(s): BNPP in the last 72 hours.    Liver Enzymes Recent Labs     03/15/21  0630   TP 5.6*   ALB 2.7*   AP 85      Thyroid Studies No results found for: T4, T3U, TSH, TSHEXT     Procedures/imaging: see electronic medical records for all procedures/Xrays and details which were not copied into this note but were reviewed prior to creation of Plan

## 2021-03-15 NOTE — PROGRESS NOTES
0725 Bedside and Verbal shift change report given to MILA Abreu RN (oncoming nurse) by SHEFALI Kee RN (offgoing nurse). Report included the following information SBAR, Kardex, Intake/Output, and MAR. Pt in bed, call light in reach. 1001 Pt assessed. No signs of acute distress. Pt in bed.    1128 Plasma started. 1141 Infusion increased to 500 ml/hr. Pt tolerating well. 1205 Transfusion complete. 1722 Pt assessed. No signs of acute distress. Pt in bed. 1930 Bedside and Verbal shift change report given to RAIZA Iglesias RN (oncoming nurse) by Anant Chin. Swati Abreu RN (offgoing nurse). Report included the following information SBAR, Kardex, Intake/Output, MAR, and Recent Results. Pt in bed, call light in reach.

## 2021-03-15 NOTE — PROGRESS NOTES
2003 - Head to toe assessment completed at this time. Pt denies any chest pain. Pt is SOB with activity. Pt is alert and oriented. Pt lungs sounds diminished, bowel sounds are active. Pt denies any pain and bowel sounds are active. Pt encouraged to call for assistance. Pt left in bed with call light within reach, bed in the low position, and wheels locked. Will continue to monitor. Pt had staff to give brother his credit cards. Shift summary - Pt had an uneventful night. Pt left in bed with no signs of distress.

## 2021-03-16 LAB
ALBUMIN SERPL-MCNC: 2.8 G/DL (ref 3.4–5)
ALBUMIN/GLOB SERPL: 0.9 {RATIO} (ref 0.8–1.7)
ALP SERPL-CCNC: 97 U/L (ref 45–117)
ALT SERPL-CCNC: 24 U/L (ref 16–61)
ANION GAP SERPL CALC-SCNC: 4 MMOL/L (ref 3–18)
AST SERPL-CCNC: 14 U/L (ref 10–38)
BASOPHILS # BLD: 0 K/UL (ref 0–0.1)
BASOPHILS NFR BLD: 0 % (ref 0–2)
BILIRUB SERPL-MCNC: 0.3 MG/DL (ref 0.2–1)
BLD PROD TYP BPU: NORMAL
BPU ID: NORMAL
BUN SERPL-MCNC: 32 MG/DL (ref 7–18)
BUN/CREAT SERPL: 30 (ref 12–20)
CALCIUM SERPL-MCNC: 8.9 MG/DL (ref 8.5–10.1)
CALLED TO:,BCALL1: NORMAL
CHLORIDE SERPL-SCNC: 104 MMOL/L (ref 100–111)
CO2 SERPL-SCNC: 31 MMOL/L (ref 21–32)
CREAT SERPL-MCNC: 1.06 MG/DL (ref 0.6–1.3)
DIFFERENTIAL METHOD BLD: ABNORMAL
EOSINOPHIL # BLD: 0 K/UL (ref 0–0.4)
EOSINOPHIL NFR BLD: 0 % (ref 0–5)
ERYTHROCYTE [DISTWIDTH] IN BLOOD BY AUTOMATED COUNT: 11.5 % (ref 11.6–14.5)
GLOBULIN SER CALC-MCNC: 3.2 G/DL (ref 2–4)
GLUCOSE BLD STRIP.AUTO-MCNC: 125 MG/DL (ref 70–110)
GLUCOSE BLD STRIP.AUTO-MCNC: 128 MG/DL (ref 70–110)
GLUCOSE BLD STRIP.AUTO-MCNC: 409 MG/DL (ref 70–110)
GLUCOSE BLD STRIP.AUTO-MCNC: 435 MG/DL (ref 70–110)
GLUCOSE BLD STRIP.AUTO-MCNC: 448 MG/DL (ref 70–110)
GLUCOSE BLD STRIP.AUTO-MCNC: 448 MG/DL (ref 70–110)
GLUCOSE SERPL-MCNC: 130 MG/DL (ref 74–99)
HCT VFR BLD AUTO: 37.8 % (ref 36–48)
HGB BLD-MCNC: 13.3 G/DL (ref 13–16)
LYMPHOCYTES # BLD: 1.1 K/UL (ref 0.9–3.6)
LYMPHOCYTES NFR BLD: 12 % (ref 21–52)
MCH RBC QN AUTO: 32.9 PG (ref 24–34)
MCHC RBC AUTO-ENTMCNC: 35.2 G/DL (ref 31–37)
MCV RBC AUTO: 93.6 FL (ref 74–97)
MONOCYTES # BLD: 0.6 K/UL (ref 0.05–1.2)
MONOCYTES NFR BLD: 7 % (ref 3–10)
NEUTS SEG # BLD: 7.2 K/UL (ref 1.8–8)
NEUTS SEG NFR BLD: 81 % (ref 40–73)
PLATELET # BLD AUTO: 330 K/UL (ref 135–420)
PMV BLD AUTO: 10.4 FL (ref 9.2–11.8)
POTASSIUM SERPL-SCNC: 4.2 MMOL/L (ref 3.5–5.5)
PROT SERPL-MCNC: 6 G/DL (ref 6.4–8.2)
RBC # BLD AUTO: 4.04 M/UL (ref 4.7–5.5)
SODIUM SERPL-SCNC: 139 MMOL/L (ref 136–145)
STATUS OF UNIT,%ST: NORMAL
UNIT DIVISION, %UDIV: NORMAL
WBC # BLD AUTO: 8.9 K/UL (ref 4.6–13.2)

## 2021-03-16 PROCEDURE — 80053 COMPREHEN METABOLIC PANEL: CPT

## 2021-03-16 PROCEDURE — 82962 GLUCOSE BLOOD TEST: CPT

## 2021-03-16 PROCEDURE — 74011250636 HC RX REV CODE- 250/636: Performed by: FAMILY MEDICINE

## 2021-03-16 PROCEDURE — 74011636637 HC RX REV CODE- 636/637: Performed by: FAMILY MEDICINE

## 2021-03-16 PROCEDURE — 74011000258 HC RX REV CODE- 258: Performed by: FAMILY MEDICINE

## 2021-03-16 PROCEDURE — 65660000000 HC RM CCU STEPDOWN

## 2021-03-16 PROCEDURE — 74011000250 HC RX REV CODE- 250: Performed by: FAMILY MEDICINE

## 2021-03-16 PROCEDURE — 74011250637 HC RX REV CODE- 250/637: Performed by: HOSPITALIST

## 2021-03-16 PROCEDURE — 77010033678 HC OXYGEN DAILY

## 2021-03-16 PROCEDURE — 85025 COMPLETE CBC W/AUTO DIFF WBC: CPT

## 2021-03-16 PROCEDURE — 74011636637 HC RX REV CODE- 636/637: Performed by: HOSPITALIST

## 2021-03-16 PROCEDURE — 74011250637 HC RX REV CODE- 250/637: Performed by: FAMILY MEDICINE

## 2021-03-16 PROCEDURE — 36415 COLL VENOUS BLD VENIPUNCTURE: CPT

## 2021-03-16 PROCEDURE — 74011636637 HC RX REV CODE- 636/637: Performed by: INTERNAL MEDICINE

## 2021-03-16 RX ORDER — INSULIN GLARGINE 100 [IU]/ML
20 INJECTION, SOLUTION SUBCUTANEOUS 2 TIMES DAILY
Status: DISCONTINUED | OUTPATIENT
Start: 2021-03-16 | End: 2021-03-18 | Stop reason: HOSPADM

## 2021-03-16 RX ADMIN — FLUTICASONE FUROATE 1 PUFF: 200 POWDER RESPIRATORY (INHALATION) at 08:57

## 2021-03-16 RX ADMIN — INSULIN LISPRO 15 UNITS: 100 INJECTION, SOLUTION INTRAVENOUS; SUBCUTANEOUS at 17:25

## 2021-03-16 RX ADMIN — INSULIN GLARGINE 10 UNITS: 100 INJECTION, SOLUTION SUBCUTANEOUS at 08:57

## 2021-03-16 RX ADMIN — Medication 10 ML: at 05:27

## 2021-03-16 RX ADMIN — FAMOTIDINE 20 MG: 20 TABLET ORAL at 08:55

## 2021-03-16 RX ADMIN — Medication 10 ML: at 14:00

## 2021-03-16 RX ADMIN — Medication 2000 UNITS: at 08:56

## 2021-03-16 RX ADMIN — INSULIN LISPRO 15 UNITS: 100 INJECTION, SOLUTION INTRAVENOUS; SUBCUTANEOUS at 22:00

## 2021-03-16 RX ADMIN — ACETAMINOPHEN 650 MG: 325 TABLET ORAL at 18:04

## 2021-03-16 RX ADMIN — INSULIN LISPRO 10 UNITS: 100 INJECTION, SOLUTION INTRAVENOUS; SUBCUTANEOUS at 19:08

## 2021-03-16 RX ADMIN — INSULIN LISPRO 10 UNITS: 100 INJECTION, SOLUTION INTRAVENOUS; SUBCUTANEOUS at 08:57

## 2021-03-16 RX ADMIN — LISINOPRIL 5 MG: 5 TABLET ORAL at 08:56

## 2021-03-16 RX ADMIN — INSULIN GLARGINE 20 UNITS: 100 INJECTION, SOLUTION SUBCUTANEOUS at 20:00

## 2021-03-16 RX ADMIN — REMDESIVIR 100 MG: 100 INJECTION, POWDER, LYOPHILIZED, FOR SOLUTION INTRAVENOUS at 02:27

## 2021-03-16 RX ADMIN — HUMAN INSULIN 10 UNITS: 100 INJECTION, SOLUTION SUBCUTANEOUS at 20:00

## 2021-03-16 RX ADMIN — DEXAMETHASONE 6 MG: 4 TABLET ORAL at 08:56

## 2021-03-16 RX ADMIN — ENOXAPARIN SODIUM 40 MG: 100 INJECTION SUBCUTANEOUS at 23:00

## 2021-03-16 RX ADMIN — ENOXAPARIN SODIUM 40 MG: 100 INJECTION SUBCUTANEOUS at 12:51

## 2021-03-16 RX ADMIN — FAMOTIDINE 20 MG: 20 TABLET ORAL at 21:00

## 2021-03-16 RX ADMIN — ATORVASTATIN CALCIUM 10 MG: 10 TABLET, FILM COATED ORAL at 22:00

## 2021-03-16 RX ADMIN — LEVOTHYROXINE SODIUM 50 MCG: 0.05 TABLET ORAL at 06:34

## 2021-03-16 RX ADMIN — SENNOSIDES AND DOCUSATE SODIUM 1 TABLET: 8.6; 5 TABLET ORAL at 08:55

## 2021-03-16 RX ADMIN — Medication 10 ML: at 21:17

## 2021-03-16 NOTE — PROGRESS NOTES
0720 Bedside and Verbal shift change report given to MILA Abreu RN (oncoming nurse) by RAIZA Iglesias RN (offgoing nurse). Report included the following information SBAR, Kardex, Intake/Output, and MAR. Pt in bed, call light in reach. 0855 Pt assessed. No signs of acute distress. Pt in bed.    1255 Pt assessed. No signs of acute distress. Pt in bed.    1720 Pt assessed. No signs of acute distress. Pt in bed. 1930 Bedside and Verbal shift change report given to VIVIAN Galvan (oncoming nurse) by Gala Abreu RN (offgoing nurse). Report included the following information SBAR, Kardex, Intake/Output, MAR, and Recent Results. Pt in bed, call light in reach.

## 2021-03-16 NOTE — DIABETES MGMT
GLYCEMIC CONTROL PLAN OF CARE        Diabetes Management:      Assessment: known h/o DM2, HbA1C not within recommended range for age + comorbids on basal/bolus home regimen  - admitted for Covid pneumonia    BG in target range (non-ICU\" < 180 ; -180):  [] Yes  [x] No      Steroids: decadron 6 mg, steroid associated hyperglycemia    TDD previous day = 66 (20 Lantus + 10 (MTI) + 47 units Humalog Very Insulin Resistant Corrective Coverage)    Recommend: FBG trending down, monitor response to mealtime insulin today & make adjustments as needed    Addendum;  - pt confirmed to this writer Levemir only home regimen  - humalog was discontinued last year    -pt needs glucometer to monitor BG at home  -the script should read  -Preferred Glucometer kit per insurance   -3 test strips/3 lancets per day  -300 test strips/300 lancets per month          - recommend adjustment to home regimen r/t current A1C  - pt would benefit from Humalog mealtime insulin    Most recent blood glucose results:   Lab Results   Component Value Date/Time     (H) 03/16/2021 07:05 AM    GLUCPOC 128 (H) 03/16/2021 06:52 AM    GLUCPOC 315 (H) 03/15/2021 10:41 PM    GLUCPOC 323 (H) 03/15/2021 05:20 PM         Hypo: No    HbA1C: equivalent  to ave BGlucose of 300 mg/dl for 2-3 months prior to admission  Lab Results   Component Value Date/Time    Hemoglobin A1c 12.0 (H) 03/13/2021 05:53 AM       Adequate glycemic control PTA:  [] Yes  [x] No      Home diabetes medications:  Key Antihyperglycemic Medications             insulin detemir (LEVEMIR) 100 unit/mL injection by SubCUTAneous route nightly.         Goals:  Blood glucose will be within target range of 70 - 180 mg/dL by: 3/30    Diet:   Active Orders   Diet    DIET DIABETIC CONSISTENT CARB Regular       Education:  __X___ Refer to Diabetes Education Record                       _____ Education not indicated at this time      Veda Caballero MS, RN, CDE  Glycemic Control Team  [phone]  Pager [phone] (M-TH 8:00-4:30P)  *After Hours pager [phone]

## 2021-03-16 NOTE — PROGRESS NOTES
Hospitalist Progress Note    Patient: Courtney Alberts MRN: [id number]  CSN: [id number]    YOB: 1960  Age: 62 y.o. Sex: male    DOA: 3/12/2021 LOS:  LOS: 4 days                Assessment/Plan     Patient Active Problem List   Diagnosis Code    Hypoxia R09.02    Pneumonia due to COVID-19 virus U07.1, J12.82    Obesity (BMI 30-39. 9) E66.9    Type 2 diabetes mellitus ([address] E11.9    Hypertension I10            63 yo male with type 2 diabetes mellitus, and hypertension is admitted with hypoxia due to COVID-19 pneumonia. Feels much better    COVID 19 pneumonia -  On oxygen, wean as tolerated. Vitamin/antioxidant cocktail. Dexamethasone  remdesivir  Convalescent plasma  lovenox  proning  ID consulted. DM -  Continue with lantus, pre meal and SSI      HTN -  Controlled on home meds. Disposition : 2-3 days    Review of systems  General: No fevers or chills. Cardiovascular: No chest pain or pressure. No palpitations. Pulmonary: see above. Gastrointestinal: No nausea, vomiting. Physical Exam:  General: Awake, cooperative, no acute distress    HEENT: NC, Atraumatic. PERRLA, anicteric sclerae. Lungs: CTA Bilaterally. No Wheezing/Rhonchi/Rales. Heart:  S1 S2,  No murmur, No Rubs, No Gallops  Abdomen: Soft, Non distended, Non tender.  +Bowel sounds,   Extremities: No c/c/e  Psych:   Not anxious or agitated. Neurologic:  No acute neurological deficit. Vital signs/Intake and Output:  Visit Vitals  /60   Pulse 73   Temp 98 °F (36.7 °C)   Resp 18   Ht 5' 9\" (1.753 m)   Wt 96.2 kg (212 lb)   SpO2 92%   BMI 31.31 kg/m²     Current Shift:  No intake/output data recorded.   Last three shifts:  03/14 1901 - 03/16 0700  In: 720 [P.O.:480]  Out: -             Labs: Results:       Chemistry Recent Labs     03/16/21  0705 03/15/21  0630 03/14/21  0706   * 183* 321*    137 133*   K 4.2 4.5 4.6    103 99*   CO2 31 28 27   BUN 32* 39* 44*   CREA 1.06 1.21 1.37* CA 8.9 8.8 9.0   AGAP 4 6 7   BUCR 30* 32* 32*   AP 97 85 98   TP 6.0* 5.6* 6.0*   ALB 2.8* 2.7* 2.7*   GLOB 3.2 2.9 3.3   AGRAT 0.9 0.9 0.8      CBC w/Diff Recent Labs     03/16/21  0705 03/15/21  0630 03/14/21  0706   WBC 8.9 14.0* 12.8   RBC 4.04* 3.73* 3.91*   HGB 13.3 12.6* 12.8*   HCT 37.8 34.8* 36.5    290 269   GRANS 81* 88*  --    LYMPH 12* 7*  --    EOS 0 0  --       Cardiac Enzymes No results for input(s): CPK, CKND1, MONICA in the last 72 hours. No lab exists for component: CKRMB, TROIP   Coagulation No results for input(s): PTP, INR, APTT, INREXT, INREXT in the last 72 hours. Lipid Panel No results found for: CHOL, CHOLPOCT, CHOLX, CHLST, CHOLV, 290089, HDL, HDLP, LDL, LDLC, DLDLP, 598290, VLDLC, VLDL, TGLX, TRIGL, TRIGP, TGLPOCT, CHHD, CHHDX   BNP No results for input(s): BNPP in the last 72 hours.    Liver Enzymes Recent Labs     03/16/21  0705   TP 6.0*   ALB 2.8*   AP 97      Thyroid Studies No results found for: T4, T3U, TSH, TSHEXT, TSHEXT     Procedures/imaging: see electronic medical records for all procedures/Xrays and details which were not copied into this note but were reviewed prior to creation of Plan

## 2021-03-16 NOTE — PROGRESS NOTES
Problem: Falls - Risk of  Goal: *Absence of Falls  Description: Document Magui Cid Fall Risk and appropriate interventions in the flowsheet.   Outcome: Progressing Towards Goal  Note: Fall Risk Interventions:            Medication Interventions: Patient to call before getting OOB, Teach patient to arise slowly                   Problem: Pain  Goal: *Control of Pain  Outcome: Progressing Towards Goal     Problem: Breathing Pattern - Ineffective  Goal: *Absence of hypoxia  Outcome: Progressing Towards Goal

## 2021-03-16 NOTE — ROUTINE PROCESS
Bedside and Verbal shift change report given to Charmayne, RN (oncoming nurse) by Deana Blakely  (offgoing nurse).  Report included the following information SBAR, Kardex, Intake/Output, MAR, Recent Results, and Cardiac Rhythm SR.

## 2021-03-17 LAB
ALBUMIN SERPL-MCNC: 2.5 G/DL (ref 3.4–5)
ALBUMIN/GLOB SERPL: 0.9 {RATIO} (ref 0.8–1.7)
ALP SERPL-CCNC: 91 U/L (ref 45–117)
ALT SERPL-CCNC: 23 U/L (ref 16–61)
ANION GAP SERPL CALC-SCNC: 4 MMOL/L (ref 3–18)
AST SERPL-CCNC: 15 U/L (ref 10–38)
BASOPHILS # BLD: 0 K/UL (ref 0–0.1)
BASOPHILS NFR BLD: 0 % (ref 0–2)
BILIRUB SERPL-MCNC: 0.3 MG/DL (ref 0.2–1)
BUN SERPL-MCNC: 31 MG/DL (ref 7–18)
BUN/CREAT SERPL: 32 (ref 12–20)
CALCIUM SERPL-MCNC: 8.6 MG/DL (ref 8.5–10.1)
CHLORIDE SERPL-SCNC: 104 MMOL/L (ref 100–111)
CO2 SERPL-SCNC: 32 MMOL/L (ref 21–32)
CREAT SERPL-MCNC: 0.98 MG/DL (ref 0.6–1.3)
DIFFERENTIAL METHOD BLD: ABNORMAL
EOSINOPHIL # BLD: 0 K/UL (ref 0–0.4)
EOSINOPHIL NFR BLD: 0 % (ref 0–5)
ERYTHROCYTE [DISTWIDTH] IN BLOOD BY AUTOMATED COUNT: 11.7 % (ref 11.6–14.5)
GLOBULIN SER CALC-MCNC: 2.7 G/DL (ref 2–4)
GLUCOSE BLD STRIP.AUTO-MCNC: 122 MG/DL (ref 70–110)
GLUCOSE BLD STRIP.AUTO-MCNC: 247 MG/DL (ref 70–110)
GLUCOSE BLD STRIP.AUTO-MCNC: 325 MG/DL (ref 70–110)
GLUCOSE BLD STRIP.AUTO-MCNC: 79 MG/DL (ref 70–110)
GLUCOSE SERPL-MCNC: 82 MG/DL (ref 74–99)
HCT VFR BLD AUTO: 35 % (ref 36–48)
HGB BLD-MCNC: 11.5 G/DL (ref 13–16)
LYMPHOCYTES # BLD: 1.3 K/UL (ref 0.9–3.6)
LYMPHOCYTES NFR BLD: 15 % (ref 21–52)
MCH RBC QN AUTO: 31.2 PG (ref 24–34)
MCHC RBC AUTO-ENTMCNC: 32.9 G/DL (ref 31–37)
MCV RBC AUTO: 94.9 FL (ref 74–97)
MONOCYTES # BLD: 0.8 K/UL (ref 0.05–1.2)
MONOCYTES NFR BLD: 9 % (ref 3–10)
NEUTS SEG # BLD: 6.5 K/UL (ref 1.8–8)
NEUTS SEG NFR BLD: 76 % (ref 40–73)
PLATELET # BLD AUTO: 353 K/UL (ref 135–420)
PMV BLD AUTO: 10.8 FL (ref 9.2–11.8)
POTASSIUM SERPL-SCNC: 4.2 MMOL/L (ref 3.5–5.5)
PROT SERPL-MCNC: 5.2 G/DL (ref 6.4–8.2)
RBC # BLD AUTO: 3.69 M/UL (ref 4.7–5.5)
SODIUM SERPL-SCNC: 140 MMOL/L (ref 136–145)
WBC # BLD AUTO: 8.5 K/UL (ref 4.6–13.2)

## 2021-03-17 PROCEDURE — 77010033678 HC OXYGEN DAILY

## 2021-03-17 PROCEDURE — 36415 COLL VENOUS BLD VENIPUNCTURE: CPT

## 2021-03-17 PROCEDURE — 74011636637 HC RX REV CODE- 636/637: Performed by: HOSPITALIST

## 2021-03-17 PROCEDURE — 74011000250 HC RX REV CODE- 250: Performed by: FAMILY MEDICINE

## 2021-03-17 PROCEDURE — 74011250637 HC RX REV CODE- 250/637: Performed by: FAMILY MEDICINE

## 2021-03-17 PROCEDURE — 74011636637 HC RX REV CODE- 636/637: Performed by: FAMILY MEDICINE

## 2021-03-17 PROCEDURE — 80053 COMPREHEN METABOLIC PANEL: CPT

## 2021-03-17 PROCEDURE — 74011250636 HC RX REV CODE- 250/636: Performed by: FAMILY MEDICINE

## 2021-03-17 PROCEDURE — 65660000000 HC RM CCU STEPDOWN

## 2021-03-17 PROCEDURE — 74011250637 HC RX REV CODE- 250/637: Performed by: HOSPITALIST

## 2021-03-17 PROCEDURE — 85025 COMPLETE CBC W/AUTO DIFF WBC: CPT

## 2021-03-17 PROCEDURE — 74011636637 HC RX REV CODE- 636/637: Performed by: INTERNAL MEDICINE

## 2021-03-17 PROCEDURE — 74011000258 HC RX REV CODE- 258: Performed by: FAMILY MEDICINE

## 2021-03-17 PROCEDURE — 82962 GLUCOSE BLOOD TEST: CPT

## 2021-03-17 RX ADMIN — INSULIN GLARGINE 20 UNITS: 100 INJECTION, SOLUTION SUBCUTANEOUS at 08:45

## 2021-03-17 RX ADMIN — ENOXAPARIN SODIUM 40 MG: 100 INJECTION SUBCUTANEOUS at 12:18

## 2021-03-17 RX ADMIN — INSULIN LISPRO 10 UNITS: 100 INJECTION, SOLUTION INTRAVENOUS; SUBCUTANEOUS at 12:18

## 2021-03-17 RX ADMIN — LEVOTHYROXINE SODIUM 50 MCG: 0.05 TABLET ORAL at 07:03

## 2021-03-17 RX ADMIN — DEXAMETHASONE 6 MG: 4 TABLET ORAL at 08:46

## 2021-03-17 RX ADMIN — FLUTICASONE FUROATE 1 PUFF: 200 POWDER RESPIRATORY (INHALATION) at 09:00

## 2021-03-17 RX ADMIN — FAMOTIDINE 20 MG: 20 TABLET ORAL at 22:10

## 2021-03-17 RX ADMIN — INSULIN LISPRO 6 UNITS: 100 INJECTION, SOLUTION INTRAVENOUS; SUBCUTANEOUS at 16:54

## 2021-03-17 RX ADMIN — Medication 10 ML: at 06:16

## 2021-03-17 RX ADMIN — FAMOTIDINE 20 MG: 20 TABLET ORAL at 08:46

## 2021-03-17 RX ADMIN — INSULIN GLARGINE 20 UNITS: 100 INJECTION, SOLUTION SUBCUTANEOUS at 22:10

## 2021-03-17 RX ADMIN — INSULIN LISPRO 10 UNITS: 100 INJECTION, SOLUTION INTRAVENOUS; SUBCUTANEOUS at 16:55

## 2021-03-17 RX ADMIN — Medication 2000 UNITS: at 08:47

## 2021-03-17 RX ADMIN — LISINOPRIL 5 MG: 5 TABLET ORAL at 08:46

## 2021-03-17 RX ADMIN — INSULIN LISPRO 12 UNITS: 100 INJECTION, SOLUTION INTRAVENOUS; SUBCUTANEOUS at 22:11

## 2021-03-17 RX ADMIN — ATORVASTATIN CALCIUM 10 MG: 10 TABLET, FILM COATED ORAL at 22:10

## 2021-03-17 RX ADMIN — ENOXAPARIN SODIUM 40 MG: 100 INJECTION SUBCUTANEOUS at 23:51

## 2021-03-17 RX ADMIN — REMDESIVIR 100 MG: 100 INJECTION, POWDER, LYOPHILIZED, FOR SOLUTION INTRAVENOUS at 02:58

## 2021-03-17 RX ADMIN — SENNOSIDES AND DOCUSATE SODIUM 1 TABLET: 8.6; 5 TABLET ORAL at 08:47

## 2021-03-17 RX ADMIN — Medication 10 ML: at 23:51

## 2021-03-17 RX ADMIN — Medication 10 ML: at 14:37

## 2021-03-17 NOTE — PROGRESS NOTES
Hospitalist Progress Note    Patient: Omi Woodruff MRN: [id number]  CSN: [id number]    YOB: 1960  Age: 62 y.o. Sex: male    DOA: 3/12/2021 LOS:  LOS: 5 days                Assessment/Plan     Patient Active Problem List   Diagnosis Code    Hypoxia R09.02    Pneumonia due to COVID-19 virus U07.1, J12.82    Obesity (BMI 30-39. 9) E66.9    Type 2 diabetes mellitus ([facility] [address] E11.9    Hypertension I10            61 yo male with type 2 diabetes mellitus, and hypertension is admitted with hypoxia due to COVID-19 pneumonia. Feels much better    COVID 19 pneumonia -  On oxygen, wean as tolerated. Vitamin/antioxidant cocktail. Dexamethasone  remdesivir  Convalescent plasma  lovenox  proning  ID consulted. DM -  Continue with lantus, pre meal and SSI      HTN -  Controlled on home meds. Disposition : 2-3 days    Review of systems  General: No fevers or chills. Cardiovascular: No chest pain or pressure. No palpitations. Pulmonary: see above. Gastrointestinal: No nausea, vomiting. Physical Exam:  General: Awake, cooperative, no acute distress    HEENT: NC, Atraumatic. PERRLA, anicteric sclerae. Lungs: CTA Bilaterally. No Wheezing/Rhonchi/Rales. Heart:  S1 S2,  No murmur, No Rubs, No Gallops  Abdomen: Soft, Non distended, Non tender.  +Bowel sounds,   Extremities: No c/c/e  Psych:   Not anxious or agitated. Neurologic:  No acute neurological deficit. Vital signs/Intake and Output:  Visit Vitals  /72   Pulse 63   Temp 98.1 °F (36.7 °C)   Resp 18   Ht 5' 9\" (1.753 m)   Wt 96.2 kg (212 lb)   SpO2 91%   BMI 31.31 kg/m²     Current Shift:  No intake/output data recorded.   Last three shifts:  03/16 0701 - 03/17 1900  In: 480 [P.O.:480]  Out: -             Labs: Results:       Chemistry Recent Labs     03/17/21  0651 03/16/21  0705 03/15/21  0630   GLU 82 130* 183*    139 137   K 4.2 4.2 4.5    104 103   CO2 32 31 28   BUN 31* 32* 39*   CREA 0.98 1.06 1.21 CA 8.6 8.9 8.8   AGAP 4 4 6   BUCR 32* 30* 32*   AP 91 97 85   TP 5.2* 6.0* 5.6*   ALB 2.5* 2.8* 2.7*   GLOB 2.7 3.2 2.9   AGRAT 0.9 0.9 0.9      CBC w/Diff Recent Labs     03/17/21  0651 03/16/21  0705 03/15/21  0630   WBC 8.5 8.9 14.0*   RBC 3.69* 4.04* 3.73*   HGB 11.5* 13.3 12.6*   HCT 35.0* 37.8 34.8*    330 290   GRANS 76* 81* 88*   LYMPH 15* 12* 7*   EOS 0 0 0      Cardiac Enzymes No results for input(s): CPK, CKND1, MONICA in the last 72 hours. No lab exists for component: CKRMB, TROIP   Coagulation No results for input(s): PTP, INR, APTT, INREXT, INREXT in the last 72 hours. Lipid Panel No results found for: CHOL, CHOLPOCT, CHOLX, CHLST, CHOLV, 369668, HDL, HDLP, LDL, LDLC, DLDLP, 917608, VLDLC, VLDL, TGLX, TRIGL, TRIGP, TGLPOCT, CHHD, CHHDX   BNP No results for input(s): BNPP in the last 72 hours.    Liver Enzymes Recent Labs     03/17/21  0651   TP 5.2*   ALB 2.5*   AP 91      Thyroid Studies No results found for: T4, T3U, TSH, TSHEXT, TSHEXT     Procedures/imaging: see electronic medical records for all procedures/Xrays and details which were not copied into this note but were reviewed prior to creation of Plan

## 2021-03-17 NOTE — PROGRESS NOTES
Bedside shift change report given to April RN (oncoming nurse) by Noemi Moy RN (offgoing nurse). Report included the following information SBAR, Kardex and MAR.

## 2021-03-17 NOTE — PROGRESS NOTES
Problem: Falls - Risk of  Goal: *Absence of Falls  Description: Document Leafy Rockwell Fall Risk and appropriate interventions in the flowsheet.   Outcome: Progressing Towards Goal  Note: Fall Risk Interventions:            Medication Interventions: Bed/chair exit alarm                   Problem: Patient Education: Go to Patient Education Activity  Goal: Patient/Family Education  Outcome: Progressing Towards Goal     Problem: Pain  Goal: *Control of Pain  Outcome: Progressing Towards Goal     Problem: Patient Education: Go to Patient Education Activity  Goal: Patient/Family Education  Outcome: Progressing Towards Goal     Problem: Breathing Pattern - Ineffective  Goal: *Absence of hypoxia  Outcome: Progressing Towards Goal  Goal: *Use of effective breathing techniques  Outcome: Progressing Towards Goal     Problem: Patient Education: Go to Patient Education Activity  Goal: Patient/Family Education  Outcome: Progressing Towards Goal

## 2021-03-17 NOTE — CONSULTS
[facility]  (A Division of [address] [place] [address])    Follow-up Note      Date of Admission: 3/12/2021       Date of Note:  3/17/2021    Summary:    Mr Doris Conte is a  61y poorly controlled diabetic WM who was well until 3/7 with onset of fevers/malaise for which he went to the ED and tested (+) Covid-19. Sent home as there was no hypoxia/instability. Became progressively weaker/dyspneic/dry cough for which he was admitted 3/12. Received steroids/remdesivir over the weekend and feels better today. No anosmia or diarrhea.     [employer] [facility]     3/7 - COVID-19 (+)  Dexamethasone (3/12-)  Remdesivir (3/13-17)     ABO O-    20  [id number]  [id number] Completed 03/16/21 0616 240 mL 03/15/21 1109 03/15/21 1205          CC:  \"When can I leave? \"    Interval History:  Events last few days reviewed/tracked from afar. Received his plasma Monday. Finished his remdesivir this morning. Feels much better. Still short-winded, but better -- off oxygen supplementation. Current Antimicrobials:    Prior Antimicrobials:  none 1. remdesivir IV (3/13-17) #5       Assessment: Rec / Plan:   COVID-19 pneumonia/hypoxia    Better. Should be able to DC home today/tomorrow as tolerated off oxygen. ->Dexamethasone #5/10, Remdesivir #5, and convalescent plasma (3/15)    Home today/tomorrow. DMT2 poor control A1c 12%    HTN    JOSE LUIS - resolved    Obesity BMI 31        Microbiology:                3/12 - BCx x2 (-)                                            3/7 - COVID-19 (+)        Lines / Catheters:         peripheral      Patient Active Problem List   Diagnosis Code    Hypoxia R09.02    Pneumonia due to COVID-19 virus U07.1, J12.82    Obesity (BMI 30-39. 9) E66.9    Type 2 diabetes mellitus (HCC) E11.9    Hypertension I10       Current Facility-Administered Medications   Medication Dose Route Frequency    insulin glargine (LANTUS) injection 20 Units  20 Units SubCUTAneous BID    senna-docusate (PERICOLACE) 8.6-50 mg per tablet 1 Tab  1 Tab Oral DAILY    insulin lispro (HUMALOG) injection 10 Units  10 Units SubCUTAneous TIDAC    atorvastatin (LIPITOR) tablet 10 mg  10 mg Oral QHS    lisinopriL (PRINIVIL, ZESTRIL) tablet 5 mg  5 mg Oral DAILY    levothyroxine (SYNTHROID) tablet 50 mcg  50 mcg Oral ACB    0.9% sodium chloride infusion 250 mL  250 mL IntraVENous PRN    fluticasone furoate (ARNUITY ELLIPTA) 200 mcg/puff  1 Puff Inhalation DAILY    sodium chloride (NS) flush 5-10 mL  5-10 mL IntraVENous PRN    sodium chloride (NS) flush 5-40 mL  5-40 mL IntraVENous Q8H    sodium chloride (NS) flush 5-40 mL  5-40 mL IntraVENous PRN    acetaminophen (TYLENOL) tablet 650 mg  650 mg Oral Q6H PRN    Or    acetaminophen (TYLENOL) suppository 650 mg  650 mg Rectal Q6H PRN    polyethylene glycol (MIRALAX) packet 17 g  17 g Oral DAILY PRN    promethazine (PHENERGAN) tablet 12.5 mg  12.5 mg Oral Q6H PRN    Or    ondansetron (ZOFRAN) injection 4 mg  4 mg IntraVENous Q6H PRN    enoxaparin (LOVENOX) injection 40 mg  40 mg SubCUTAneous Q12H    guaiFENesin-dextromethorphan (ROBITUSSIN DM) 100-10 mg/5 mL syrup 5 mL  5 mL Oral Q4H PRN    dexAMETHasone (DECADRON) tablet 6 mg  6 mg Oral DAILY    cholecalciferol (VITAMIN D3) (1000 Units /25 mcg) tablet 2,000 Units  2,000 Units Oral DAILY    insulin lispro (HUMALOG) injection   SubCUTAneous AC&HS    glucose chewable tablet 16 g  16 g Oral PRN    glucagon (GLUCAGEN) injection 1 mg  1 mg IntraMUSCular PRN    dextrose 10% infusion 125-250 mL  125-250 mL IntraVENous PRN    famotidine (PEPCID) tablet 20 mg  20 mg Oral BID         Review of Systems - General ROS: negative for - chills, fever or night sweats  Respiratory ROS: positive for - shortness of breath  negative for - cough  Cardiovascular ROS: positive for - dyspnea on exertion and shortness of breath  negative for - chest pain  Gastrointestinal ROS: no abdominal pain, change in bowel habits, or black or bloody stools       Objective:    Visit Vitals  /72   Pulse 96   Temp 97.8 °F (36.6 °C)   Resp 18   Ht 5' 9\" (1.753 m)   Wt 96.2 kg (212 lb)   SpO2 91%   BMI 31.31 kg/m²       Temp (24hrs), Av.9 °F (36.6 °C), Min:97.5 °F (36.4 °C), Max:98.2 °F (36.8 °C)      GEN: WDWN WM in NAD off oxygen completely during my exam  HEENT: anicteric  CHEST: CTA  CVS:RRR  ABD: NT  EXT: no acral ischemia         Lab results:    Chemistry  Recent Labs     21  0621  0705 03/15/21  06   GLU 82 130* 183*    139 137   K 4.2 4.2 4.5    104 103   CO2 32 31 28   BUN 31* 32* 39*   CREA 0.98 1.06 1.21   CA 8.6 8.9 8.8   AGAP 4 4 6   BUCR 32* 30* 32*   AP 91 97 85   TP 5.2* 6.0* 5.6*   ALB 2.5* 2.8* 2.7*   GLOB 2.7 3.2 2.9   AGRAT 0.9 0.9 0.9       CBC w/ Diff  Recent Labs     21  0621  0705 03/15/21  0630   WBC 8.5 8.9 14.0*   RBC 3.69* 4.04* 3.73*   HGB 11.5* 13.3 12.6*   HCT 35.0* 37.8 34.8*    330 290   GRANS 76* 81* 88*   LYMPH 15* 12* 7*   EOS 0 0 0       Microbiology  All Micro Results     Procedure Component Value Units Date/Time    CULTURE, BLOOD [[id number]] Collected: 21    Order Status: Completed Specimen: Blood Updated: 21     Special Requests: NO SPECIAL REQUESTS        Culture result: NO GROWTH 5 DAYS       CULTURE, BLOOD [[id number]] Collected: 21    Order Status: Completed Specimen: Blood Updated: 21     Special Requests: NO SPECIAL REQUESTS        Culture result: NO GROWTH 5 DAYS              Leland White MD  Cell [phone]  [facility]   3/17/2021   3:33 PM

## 2021-03-17 NOTE — DIABETES MGMT
GLYCEMIC CONTROL PLAN OF CARE        Diabetes Management:      Assessment: known h/o DM2, HbA1C not within recommended range for age + comorbids on basal/bolus home regimen  - admitted for Covid pneumonia    BG in target range (non-ICU\" < 180 ; -180):  [] Yes  [x] No      Steroids: decadron 6 mg, steroid associated hyperglycemia    TDD previous day = 90 (30 Lantus + 20 (10) (MTI) + 10 Regular + 30 units Humalog Very Insulin Resistant Corrective Coverage)    Recommend: FBG trending down basal insulin decreased; regular insulin given in response to hyperglycemia yesterday  - monitor response to mealtime nsulin today, consider decrease as overall trending down    Addendum;  - pt confirmed to this writer Levemir only home regimen  - humalog was discontinued last year    -pt needs glucometer to monitor BG at home  -the script should read  -Preferred Glucometer kit per insurance   -3 test strips/3 lancets per day  -300 test strips/300 lancets per month    - recommend adjustment to home regimen r/t current A1C  - pt would benefit from Humalog mealtime insulin    Most recent blood glucose results:   Lab Results   Component Value Date/Time    GLU 82 03/17/2021 06:51 AM    GLUCPOC 122 (H) 03/17/2021 12:09 PM    GLUCPOC 79 03/17/2021 07:10 AM    GLUCPOC 448 (HH) 03/16/2021 07:04 PM         Hypo: No    HbA1C: equivalent  to ave BGlucose of 300 mg/dl for 2-3 months prior to admission  Lab Results   Component Value Date/Time    Hemoglobin A1c 12.0 (H) 03/13/2021 05:53 AM       Adequate glycemic control PTA:  [] Yes  [x] No      Home diabetes medications:  Key Antihyperglycemic Medications             insulin detemir (LEVEMIR) 100 unit/mL injection by SubCUTAneous route nightly.         Goals:  Blood glucose will be within target range of 70 - 180 mg/dL by: 3/30    Diet:   Active Orders   Diet    DIET DIABETIC WITH OPTIONS Consistent Carb 2400kcal; Regular       Education:  __X___ Refer to Diabetes Education Record _____ Education not indicated at this time      Cleveland Steele MS, RN, CDE  Glycemic Control Team  [phone]  Pager [phone] (M-TH 8:00-4:30P)  *After Hours pager [phone]

## 2021-03-17 NOTE — PROGRESS NOTES
Comprehensive Nutrition Assessment    Type and Reason for Visit: (P) Initial, RD nutrition re-screen/LOS    Nutrition Recommendations/Plan: Diet: Increase to DM 2400kcal to meet estimated needs     Nutrition Assessment:  (P) pt admitted w/ hypoxia due to COVID-19 pneumonia        Estimated Daily Nutrient Needs:  Energy (kcal): (P) 1260-0530; Weight Used for Energy Requirements: (P) Ideal  Protein (g): (P) 109-145; Weight Used for Protein Requirements: (P) Ideal(1.5-2)  Fluid (ml/day): (P) 6057-5945; Method Used for Fluid Requirements: (P) 1 ml/kcal      Nutrition Related Findings:  (P) Meds: synthroid, vit d3, pepcid, miralax, humalog, lantus, pericolace      Wounds:    (P) None       Current Nutrition Therapies:  DIET DIABETIC CONSISTENT CARB Regular    Anthropometric Measures:  Height:  (P) 5' 9\" (175.3 cm)  Current Body Wt:  (P) 96.2 kg (212 lb)   Admission Body Wt:       Usual Body Wt:  (P) 97.5 kg (215 lb)(8/2016)     Ideal Body Wt:  (P) 160 lbs:  (P) 132.5 %   Adjusted Body Weight:   ; Weight Adjustment for:     Adjusted BMI:       BMI Category:  (P) Obese class 1 (BMI 30.0-34. 9)       Nutrition Diagnosis:   (P) Overweight/obesity related to (P) excessive energy intake as evidenced by (P) lab values, BMI    Nutrition Interventions:   Food and/or Nutrient Delivery: (P) Modify current diet  Nutrition Education and Counseling: (P) No recommendations at this time  Coordination of Nutrition Care: (P) Continue to monitor while inpatient, Interdisciplinary rounds    Goals:  (P) Increase kcal to meet estimated needs in the next 5-7days       Nutrition Monitoring and Evaluation:   Behavioral-Environmental Outcomes:    Food/Nutrient Intake Outcomes: (P) Diet advancement/tolerance, Food and nutrient intake  Physical Signs/Symptoms Outcomes: (P) Biochemical data, Skin, Weight, GI status    Discharge Planning:    (P) Continue current diet     Electronically signed by Laith Giordano on 3/17/2021 at 9:10 AM

## 2021-03-18 VITALS
RESPIRATION RATE: 20 BRPM | WEIGHT: 212 LBS | HEART RATE: 60 BPM | HEIGHT: 69 IN | DIASTOLIC BLOOD PRESSURE: 81 MMHG | TEMPERATURE: 98.2 F | OXYGEN SATURATION: 94 % | BODY MASS INDEX: 31.4 KG/M2 | SYSTOLIC BLOOD PRESSURE: 139 MMHG

## 2021-03-18 LAB
BACTERIA SPEC CULT: NORMAL
BACTERIA SPEC CULT: NORMAL
COVID-19 RAPID TEST, COVR: DETECTED
GLUCOSE BLD STRIP.AUTO-MCNC: 127 MG/DL (ref 70–110)
GLUCOSE BLD STRIP.AUTO-MCNC: 139 MG/DL (ref 70–110)
GLUCOSE BLD STRIP.AUTO-MCNC: 141 MG/DL (ref 70–110)
GLUCOSE BLD STRIP.AUTO-MCNC: 337 MG/DL (ref 70–110)
GLUCOSE BLD STRIP.AUTO-MCNC: 380 MG/DL (ref 70–110)
GLUCOSE BLD STRIP.AUTO-MCNC: 402 MG/DL (ref 70–110)
SERVICE CMNT-IMP: NORMAL
SERVICE CMNT-IMP: NORMAL
SOURCE, COVRS: ABNORMAL

## 2021-03-18 PROCEDURE — 87635 SARS-COV-2 COVID-19 AMP PRB: CPT

## 2021-03-18 PROCEDURE — 74011250637 HC RX REV CODE- 250/637: Performed by: FAMILY MEDICINE

## 2021-03-18 PROCEDURE — 74011636637 HC RX REV CODE- 636/637: Performed by: FAMILY MEDICINE

## 2021-03-18 PROCEDURE — 74011250636 HC RX REV CODE- 250/636: Performed by: FAMILY MEDICINE

## 2021-03-18 PROCEDURE — 82962 GLUCOSE BLOOD TEST: CPT

## 2021-03-18 PROCEDURE — 74011250637 HC RX REV CODE- 250/637: Performed by: HOSPITALIST

## 2021-03-18 PROCEDURE — 74011636637 HC RX REV CODE- 636/637: Performed by: HOSPITALIST

## 2021-03-18 PROCEDURE — 74011636637 HC RX REV CODE- 636/637: Performed by: INTERNAL MEDICINE

## 2021-03-18 RX ORDER — DEXAMETHASONE 6 MG/1
6 TABLET ORAL
Qty: 4 TAB | Refills: 0 | Status: SHIPPED | OUTPATIENT
Start: 2021-03-18 | End: 2021-03-22

## 2021-03-18 RX ORDER — INSULIN LISPRO 100 [IU]/ML
INJECTION, SOLUTION INTRAVENOUS; SUBCUTANEOUS
Qty: 1 VIAL | Refills: 0 | Status: SHIPPED
Start: 2021-03-18

## 2021-03-18 RX ADMIN — SENNOSIDES AND DOCUSATE SODIUM 1 TABLET: 8.6; 5 TABLET ORAL at 08:24

## 2021-03-18 RX ADMIN — LEVOTHYROXINE SODIUM 50 MCG: 0.05 TABLET ORAL at 07:30

## 2021-03-18 RX ADMIN — LISINOPRIL 5 MG: 5 TABLET ORAL at 08:24

## 2021-03-18 RX ADMIN — Medication 10 ML: at 17:05

## 2021-03-18 RX ADMIN — FAMOTIDINE 20 MG: 20 TABLET ORAL at 08:24

## 2021-03-18 RX ADMIN — Medication 10 ML: at 06:07

## 2021-03-18 RX ADMIN — DEXAMETHASONE 6 MG: 4 TABLET ORAL at 08:24

## 2021-03-18 RX ADMIN — INSULIN LISPRO 10 UNITS: 100 INJECTION, SOLUTION INTRAVENOUS; SUBCUTANEOUS at 11:30

## 2021-03-18 RX ADMIN — FLUTICASONE FUROATE 1 PUFF: 200 POWDER RESPIRATORY (INHALATION) at 09:00

## 2021-03-18 RX ADMIN — ENOXAPARIN SODIUM 40 MG: 100 INJECTION SUBCUTANEOUS at 10:29

## 2021-03-18 RX ADMIN — INSULIN LISPRO 15 UNITS: 100 INJECTION, SOLUTION INTRAVENOUS; SUBCUTANEOUS at 17:34

## 2021-03-18 RX ADMIN — INSULIN LISPRO 10 UNITS: 100 INJECTION, SOLUTION INTRAVENOUS; SUBCUTANEOUS at 08:23

## 2021-03-18 RX ADMIN — Medication 2000 UNITS: at 08:24

## 2021-03-18 RX ADMIN — INSULIN LISPRO 10 UNITS: 100 INJECTION, SOLUTION INTRAVENOUS; SUBCUTANEOUS at 17:35

## 2021-03-18 RX ADMIN — INSULIN GLARGINE 20 UNITS: 100 INJECTION, SOLUTION SUBCUTANEOUS at 08:24

## 2021-03-18 NOTE — CONSULTS
[facility]  (A Division of [address] [place] [address])    Follow-up Note      Date of Admission: 3/12/2021       Date of Note:  3/18/2021    Summary:    Mr Maikol Morales is a  61y poorly controlled diabetic WM who was well until 3/7 with onset of fevers/malaise for which he went to the ED and tested (+) Covid-19.  Sent home as there was no hypoxia/instability.  Became progressively weaker/dyspneic/dry cough for which he was admitted 3/12.  Received steroids/remdesivir over the weekend and feels better today.  No anosmia or diarrhea.     [facility]     3/7 - COVID-19 (+)  Dexamethasone (3/12-)  Remdesivir (3/13-17)     ABO O-    20  [id number]  [id number] Completed 03/16/21 0616 240 mL 03/15/21 1109 03/15/21 1205           CC:  \"I had a great/satisfying/big BM this morning\"    Interval History:  And much better. Events overnight reviewed/seen at bedside. Continues to do well. Worried about work. Current Antimicrobials:    Prior Antimicrobials:  none 1. remdesivir IV (3/13-17) #5       Assessment: Rec / Plan:   COVID-19 pneumonia/hypoxia    Continues to look better. Home today. ->Dexamethasone #6/10, Remdesivir #5, and convalescent plasma (3/15)    Continue to self-quarantine until 28IZG25  Vaccine in 3-9m  Donate plasma next month late/early May   DMT2 poor control A1c 12%    HTN    JOSE LUIS - resolved    Obesity BMI 31        Microbiology:                3/12 - BCx x2 (-)                                            3/7 - COVID-19 (+)        Lines / Catheters:         peripheral        Patient Active Problem List   Diagnosis Code    Hypoxia R09.02    Pneumonia due to COVID-19 virus U07.1, J12.82    Obesity (BMI 30-39. 9) E66.9    Type 2 diabetes mellitus (HCC) E11.9    Hypertension I10       Current Facility-Administered Medications   Medication Dose Route Frequency    insulin glargine (LANTUS) injection 20 Units  20 Units SubCUTAneous BID    senna-docusate (PERICOLACE) 8.6-50 mg per tablet 1 Tab  1 Tab Oral DAILY    insulin lispro (HUMALOG) injection 10 Units  10 Units SubCUTAneous TIDAC    atorvastatin (LIPITOR) tablet 10 mg  10 mg Oral QHS    lisinopriL (PRINIVIL, ZESTRIL) tablet 5 mg  5 mg Oral DAILY    levothyroxine (SYNTHROID) tablet 50 mcg  50 mcg Oral ACB    0.9% sodium chloride infusion 250 mL  250 mL IntraVENous PRN    fluticasone furoate (ARNUITY ELLIPTA) 200 mcg/puff  1 Puff Inhalation DAILY    sodium chloride (NS) flush 5-10 mL  5-10 mL IntraVENous PRN    sodium chloride (NS) flush 5-40 mL  5-40 mL IntraVENous Q8H    sodium chloride (NS) flush 5-40 mL  5-40 mL IntraVENous PRN    acetaminophen (TYLENOL) tablet 650 mg  650 mg Oral Q6H PRN    Or    acetaminophen (TYLENOL) suppository 650 mg  650 mg Rectal Q6H PRN    polyethylene glycol (MIRALAX) packet 17 g  17 g Oral DAILY PRN    promethazine (PHENERGAN) tablet 12.5 mg  12.5 mg Oral Q6H PRN    Or    ondansetron (ZOFRAN) injection 4 mg  4 mg IntraVENous Q6H PRN    enoxaparin (LOVENOX) injection 40 mg  40 mg SubCUTAneous Q12H    guaiFENesin-dextromethorphan (ROBITUSSIN DM) 100-10 mg/5 mL syrup 5 mL  5 mL Oral Q4H PRN    dexAMETHasone (DECADRON) tablet 6 mg  6 mg Oral DAILY    cholecalciferol (VITAMIN D3) (1000 Units /25 mcg) tablet 2,000 Units  2,000 Units Oral DAILY    insulin lispro (HUMALOG) injection   SubCUTAneous AC&HS    glucose chewable tablet 16 g  16 g Oral PRN    glucagon (GLUCAGEN) injection 1 mg  1 mg IntraMUSCular PRN    dextrose 10% infusion 125-250 mL  125-250 mL IntraVENous PRN    famotidine (PEPCID) tablet 20 mg  20 mg Oral BID         Review of Systems - General ROS: negative for - chills, fever or night sweats  Respiratory ROS: no cough, shortness of breath, or wheezing       Objective:    Visit Vitals  /81   Pulse 60   Temp 98.2 °F (36.8 °C)   Resp 20   Ht 5' 9\" (1.753 m)   Wt 96.2 kg (212 lb)   SpO2 94%   BMI 31.31 kg/m²       Temp (24hrs), Av.3 °F (36.8 °C), Min:98.1 °F (36.7 °C), Max:98.6 °F (37 °C)      GEN: WDWN WM in NAD lying on bed  HEENT: anicteric  CHEST: CTA  CVS:RRR         Lab results:    Chemistry  Recent Labs     03/17/21 0651 03/16/21  0705   GLU 82 130*    139   K 4.2 4.2    104   CO2 32 31   BUN 31* 32*   CREA 0.98 1.06   CA 8.6 8.9   AGAP 4 4   BUCR 32* 30*   AP 91 97   TP 5.2* 6.0*   ALB 2.5* 2.8*   GLOB 2.7 3.2   AGRAT 0.9 0.9       CBC w/ Diff  Recent Labs     03/17/21  0651 03/16/21  0705   WBC 8.5 8.9   RBC 3.69* 4.04*   HGB 11.5* 13.3   HCT 35.0* 37.8    330   GRANS 76* 81*   LYMPH 15* 12*   EOS 0 0       Microbiology  All Micro Results     Procedure Component Value Units Date/Time    CULTURE, BLOOD [[id number]] Collected: 03/12/21 2209    Order Status: Completed Specimen: Blood Updated: 03/18/21 0719     Special Requests: NO SPECIAL REQUESTS        Culture result: NO GROWTH 6 DAYS       CULTURE, BLOOD [[id number]] Collected: 03/12/21 2200    Order Status: Completed Specimen: Blood Updated: 03/18/21 0719     Special Requests: NO SPECIAL REQUESTS        Culture result: NO GROWTH 6 DAYS              Paulino Cook MD  Cell [phone]  [facility]   3/18/2021   12:28 PM

## 2021-03-18 NOTE — DIABETES MGMT
GLYCEMIC CONTROL PLAN OF CARE        Diabetes Management:      Assessment: known h/o DM2, HbA1C not within recommended range for age + comorbids on basal/bolus home regimen  - admitted for Covid pneumonia    BG in target range (non-ICU\" < 180 ; -180):  [] Yes  [x] No      Steroids: decadron 6 mg, steroid associated hyperglycemia    TDD previous day = 78 (40 Lantus + 20 (10) (MTI) + 18 units Humalog Very Insulin Resistant Corrective Coverage)    Recommend: continue with current regimen, consider increase in mealtime insulin if PPG excursions increase    Addendum;  - pt confirmed to this writer Levemir only home regimen  - humalog was discontinued last year    -pt needs glucometer to monitor BG at home  -the script should read  -Preferred Glucometer kit per insurance   -3 test strips/3 lancets per day  -300 test strips/300 lancets per month    - recommend adjustment to home regimen r/t current A1C  - pt would benefit from Humalog mealtime insulin    Most recent blood glucose results:   Lab Results   Component Value Date/Time    GLUCPOC 127 (H) 03/18/2021 06:16 AM    GLUCPOC 139 (H) 03/18/2021 04:18 AM    GLUCPOC 337 (H) 03/17/2021 11:59 PM         Hypo: No    HbA1C: equivalent  to ave BGlucose of 300 mg/dl for 2-3 months prior to admission  Lab Results   Component Value Date/Time    Hemoglobin A1c 12.0 (H) 03/13/2021 05:53 AM       Adequate glycemic control PTA:  [] Yes  [x] No      Home diabetes medications:  Key Antihyperglycemic Medications             insulin detemir (LEVEMIR) 100 unit/mL injection by SubCUTAneous route nightly.         Goals:  Blood glucose will be within target range of 70 - 180 mg/dL by: 3/30    Diet:   Active Orders   Diet    DIET DIABETIC WITH OPTIONS Consistent Carb 2400kcal; Regular       Education:  __X___ Refer to Diabetes Education Record                       _____ Education not indicated at this time      Hossein Santiago MS, RN, CDE  Glycemic Control Team  [phone]  Pager [phone] (M-TH 8:00-4:30P)  *After Hours pager [phone]

## 2021-03-18 NOTE — PROGRESS NOTES
Discharge Planning: Home with family assist and MD follow-up    CM spoke with the patient regarding plans for discharge today. The patient states that his brother will be available to pick him up upon discharge. The patient denies any additional questions or concerns at this time. CM to follow the patient's progress and be available to assist with discharge planning as needed. CMS referral placed. Care Management Interventions  PCP Verified by CM: Yes(PIC clinic)  Mode of Transport at Discharge: Other (see comment)(family)  Transition of Care Consult (CM Consult): Discharge Planning  Current Support Network:  Other(lives with brother)  Confirm Follow Up Transport: Family  The Plan for Transition of Care is Related to the Following Treatment Goals : Hypoxia  Discharge Location  Discharge Placement: Home with family assistance

## 2021-03-18 NOTE — DISCHARGE SUMMARY
Discharge Summary    Patient: Joaquin Tavera MRN: [id number]  CSN: [id number]    YOB: 1960  Age: 62 y.o. Sex: male    DOA: 3/12/2021 LOS:  LOS: 6 days   Discharge Date:      Primary Care Provider:  No primary care provider on file. Admission Diagnoses: Hypoxia [R09.02]    Discharge Diagnoses:    Problem List as of 3/18/2021 Date Reviewed: 3/13/2021          Codes Class Noted - Resolved    Pneumonia due to COVID-19 virus ICD-10-CM: U07.1, J12.82  ICD-9-CM: 480.8  3/13/2021 - Present        Obesity (BMI 30-39. 9) ICD-10-CM: E66.9  ICD-9-CM: 278.00  3/13/2021 - Present        Type 2 diabetes mellitus ([facility] [address] ICD-10-CM: E11.9  ICD-9-CM: 250.00  3/13/2021 - Present        Hypertension ICD-10-CM: I10  ICD-9-CM: 401.9  3/13/2021 - Present        * (Principal) Hypoxia ICD-10-CM: R09.02  ICD-9-CM: 799.02  3/12/2021 - Present              Discharge Medications:     Current Discharge Medication List      START taking these medications    Details   dexAMETHasone (DECADRON) 6 mg tablet Take 1 Tab by mouth Daily (before breakfast) for 4 days. Qty: 4 Tab, Refills: 0      insulin lispro (HUMALOG) 100 unit/mL injection For Blood Sugar (mg/dL) of:              Less than 150 =   0 units  150 -199 =   3 units  200 -249 =   6 units  250 -299 =   9 units  300 -349 =   12 units  350 and above =   15 units  Initiate Hypoglycemic protocol if blood glucose is <70 mg/dL. IN CORRECTIVE PROTOCOL: Normal Insulin Sensitivity   For Blood Sugar (mg/dL) of:     Less than 150 =   0 units           150 -199 =   2 units  200 -249 =   4 units  250 -299 =   6 units  300 -349 =   8 units  350 and above = 10 units and Call Physician  Qty: 1 Vial, Refills: 0         CONTINUE these medications which have NOT CHANGED    Details   albuterol (PROVENTIL HFA, VENTOLIN HFA, PROAIR HFA) 90 mcg/actuation inhaler Take 2 Puffs by inhalation every four (4) hours as needed for Wheezing.   Qty: 1 Inhaler, Refills: 0      insulin detemir (LEVEMIR) 100 unit/mL injection by SubCUTAneous route nightly. levothyroxine (SYNTHROID) 50 mcg tablet Take  by mouth Daily (before breakfast). lisinopril (PRINIVIL, ZESTRIL) 5 mg tablet Take 5 mg by mouth daily. simvastatin (ZOCOR) 20 mg tablet Take  by mouth nightly. cyclobenzaprine (FLEXERIL) 5 mg tablet Take 1 Tab by mouth three (3) times daily as needed for Muscle Spasm(s). Qty: 15 Tab, Refills: 0         STOP taking these medications       doxycycline (VIBRA-TABS) 100 mg tablet Comments:   Reason for Stopping:         ibuprofen (MOTRIN) 600 mg tablet Comments:   Reason for Stopping:               Discharge Condition: Good    Procedures : None    Consults: Infectious Disease      PHYSICAL EXAM   Visit Vitals  /81   Pulse 60   Temp 98.2 °F (36.8 °C)   Resp 20   Ht 5' 9\" (1.753 m)   Wt 96.2 kg (212 lb)   SpO2 94%   BMI 31.31 kg/m²     General: Awake, cooperative, no acute distress    HEENT: NC, Atraumatic. PERRLA, EOMI. Anicteric sclerae. Lungs:  CTA Bilaterally. No Wheezing/Rhonchi/Rales. Heart:  Regular  rhythm,  No murmur, No Rubs, No Gallops  Abdomen: Soft, Non distended, Non tender. +Bowel sounds,   Extremities: No c/c/e  Psych:   Not anxious or agitated. Neurologic:  No acute neurological deficits. Admission HPI :   Katrina Gale is a 62 y.o. male with obesity, type 2 diabetes mellitus, and hypertension who presents to the ED with worsening cough and shortness of breath. He started getting sick and tested positive for COVID-19 on 3/7. Has also has had fever. Denies chest pain, diarrhea, nausea, or vomiting. He works at [employer]. Hospital Course :   Mr. Cameron Richardson was admitted to COVID-19 unit, he was seen and followed by infectious disease, he did not had any acute events during hospitalization.     COVID-19 pneumonia-  He was started on oxygen, he was gradually weaned off of oxygen and now he is tolerating well on room air his oxygen saturations 95% or above on room air. He was also started on vitamin/antioxidant cocktail. Also started on dexamethasone and will complete 10-day course, completed 5-day course of remdesivir. He received convalescent plasma. He was also placed on Lovenox for DVT prophylaxis. DM-  Continued with basal insulin, started on premeal and sliding scale insulin. And ADA diet. Resume with his home basal insulin at discharge. Have also discharge him on sliding scale insulin until he completes Decadron course. Hypertension-  Continued home medications, blood pressure control during hospitalization. Activity: Activity as tolerated    Diet: Diabetic Diet    Follow-up: PCP    Disposition: home    Minutes spent on discharge: 45       Labs: Results:       Chemistry Recent Labs     03/17/21  0651 03/16/21  0705   GLU 82 130*    139   K 4.2 4.2    104   CO2 32 31   BUN 31* 32*   CREA 0.98 1.06   CA 8.6 8.9   AGAP 4 4   BUCR 32* 30*   AP 91 97   TP 5.2* 6.0*   ALB 2.5* 2.8*   GLOB 2.7 3.2   AGRAT 0.9 0.9      CBC w/Diff Recent Labs     03/17/21  0651 03/16/21  0705   WBC 8.5 8.9   RBC 3.69* 4.04*   HGB 11.5* 13.3   HCT 35.0* 37.8    330   GRANS 76* 81*   LYMPH 15* 12*   EOS 0 0      Cardiac Enzymes No results for input(s): CPK, CKND1, MONICA in the last 72 hours. No lab exists for component: CKRMB, TROIP   Coagulation No results for input(s): PTP, INR, APTT, INREXT in the last 72 hours. Lipid Panel No results found for: CHOL, CHOLPOCT, CHOLX, CHLST, CHOLV, 419502, HDL, HDLP, LDL, LDLC, DLDLP, 734294, VLDLC, VLDL, TGLX, TRIGL, TRIGP, TGLPOCT, CHHD, CHHDX   BNP No results for input(s): BNPP in the last 72 hours.    Liver Enzymes Recent Labs     03/17/21  0651   TP 5.2*   ALB 2.5*   AP 91      Thyroid Studies No results found for: T4, T3U, TSH, TSHEXT         Significant Diagnostic Studies: Xr Chest Port    Result Date: 3/12/2021  EXAM: CHEST RADIOGRAPH, SINGLE VIEW CLINICAL INDICATION/HISTORY: meets SIRS criteria. Covid positive COMPARISON: Single view chest 3/8/2021 TECHNIQUE: Portable frontal view of the chest was obtained. _______________ FINDINGS: SUPPORT DEVICES: None. HEART AND MEDIASTINUM: Cardiomediastinal silhouette appears within normal limits. Normal caliber thoracic aorta. No central vascular congestion. LUNGS AND PLEURAL SPACES: New patchy infiltrates bilateral peripheral lower lung fields. No focal alveolar consolidation or effusion. No pneumothorax. BONY THORAX AND SOFT TISSUES: No acute osseous abnormality. _______________     New bilateral lower lung field patchy infiltrates. The appearance is highly suggestive of Covid pneumonia. Xr Chest Port    Result Date: 3/8/2021  EXAM: XR CHEST PORT CLINICAL INDICATION/HISTORY: cough -Additional: None COMPARISON: 02/12/2012 TECHNIQUE: Frontal view of the chest _______________ FINDINGS: HEART AND MEDIASTINUM: Midline cardiac silhouette, normal in size. Unremarkable hilar vascular structures. LUNGS AND PLEURAL SPACES: Minimal lateral left basilar linear opacity. Otherwise, no focal consolidation, parenchymal opacity, pneumothorax or effusion. BONY THORAX AND SOFT TISSUES: No acute or destructive osseous abnormality. _______________     1. Minimal left basal linear atelectasis and/or scar. Echo Adult Complete    Result Date: 3/14/2021  · LV: Estimated LVEF is 55 - 60%. Normal cavity size and systolic function (ejection fraction normal). Mild concentric hypertrophy. Mild (grade 1) left ventricular diastolic dysfunction E/E' ratio is 6.39. · LA: Mildly dilated left atrium. · RV: Moderately dilated right ventricle. · RA: Mildly dilated right atrium. · AV: Probably trileaflet aortic valve. · MV: Mitral valve non-specific thickening and thickening. Mitral valve leaflet calcification. Mild mitral annular calcification. · TV: Normal valve structure and no stenosis. Tricuspid regurgitation is inadequate for estimation of right ventricular systolic pressure. Duplex Lower Ext Venous Bilat    Result Date: 3/14/2021  · No evidence of deep vein thrombosis in the right lower extremity. · No evidence of deep vein thrombosis in the left lower extremity. No results found for this or any previous visit. Please note that this dictation was completed with [employer], the [employer] voice recognition software. Quite often unanticipated grammatical, syntax, homophones, and other interpretive errors are inadvertently transcribed by the computer software. Please disregard these errors. Please excuse any errors that have escaped final proofreading. CC: No primary care provider on file.

## 2021-03-18 NOTE — PROGRESS NOTES
Bedside shift change report given to [address] [place] [address] (oncoming nurse) by Lorelei Bradford RN (offgoing nurse). Report included the following information SBAR, Kardex and Recent Results.

## 2021-03-18 NOTE — PROGRESS NOTES
Physician Progress Note      Dawn Ware  [facility] #:                  [id number]  :                       1960  ADMIT DATE:       3/12/2021 8:39 PM  [address] [place] DATE:  RESPONDING  PROVIDER #:        Thalia Bañuelos MD          QUERY TEXT:    Pt admitted with Covid . Pt noted to have SOB. If possible, please document in the progress notes and discharge summary if you are evaluating and/or treating any of the following: The medical record reflects the following:  Risk Factors: covid 19 pneumonia  Clinical Indicators: Per ED , patient was SOB with increase work of breathing .  ABG's Po2 60 ,RR 35,Hypoxia,  Treatment: O2 2-3L NC    Thank- You  Brooklyn Mathews RN  [phone]  Options provided:  -- Acute respiratory failure with hypoxia  Due to Covid 19  -- Acute respiratory failure with hypoxia only and not  Due to Covid 19  -- Acute respiratory failure ruled out  -- Other - I will add my own diagnosis  -- Disagree - Not applicable / Not valid  -- Disagree - Clinically unable to determine / Unknown  -- Refer to Clinical Documentation Reviewer    PROVIDER RESPONSE TEXT:    See progress note    Query created by: Jody Lee on 3/18/2021 10:58 AM      Electronically signed by:  Thalia Bañuelos MD 3/18/2021 11:01 AM

## 2021-03-18 NOTE — PROGRESS NOTES
Problem: Falls - Risk of  Goal: *Absence of Falls  Description: Document Nathalie Goldberg Fall Risk and appropriate interventions in the flowsheet.   Outcome: Progressing Towards Goal  Note: Fall Risk Interventions:            Medication Interventions: Teach patient to arise slowly                   Problem: Patient Education: Go to Patient Education Activity  Goal: Patient/Family Education  Outcome: Progressing Towards Goal     Problem: Pain  Goal: *Control of Pain  Outcome: Progressing Towards Goal     Problem: Patient Education: Go to Patient Education Activity  Goal: Patient/Family Education  Outcome: Progressing Towards Goal     Problem: Breathing Pattern - Ineffective  Goal: *Absence of hypoxia  Outcome: Progressing Towards Goal  Goal: *Use of effective breathing techniques  Outcome: Progressing Towards Goal

## 2021-03-18 NOTE — DISCHARGE INSTRUCTIONS
Patient Education        10 Things to Do When You Have COVID-19    Stay home. Don't go to school, work, or public areas. And don't use public transportation, ride-shares, or taxis unless you have no choice. Leave your home only if you need to get medical care. But call the doctor's office first so they know you're coming. And wear a cloth face cover. Ask before leaving isolation. Talk with your doctor or other health professional about when it will be safe for you to leave isolation. Wear a cloth face cover when you are around other people. It can help stop the spread of the virus when you cough or sneeze. Limit contact with people in your home. If possible, stay in a separate bedroom and use a separate bathroom. Avoid contact with pets and other animals. If possible, have a friend or family member care for them while you're sick. Cover your mouth and nose with a tissue when you cough or sneeze. Then throw the tissue in the trash right away. Wash your hands often, especially after you cough or sneeze. Use soap and water, and scrub for at least 20 seconds. If soap and water aren't available, use an alcohol-based hand . Don't share personal household items. These include bedding, towels, cups and glasses, and eating utensils. Clean and disinfect your home every day. Use household  or disinfectant wipes or sprays. Take special care to clean things that you grab with your hands. These include doorknobs, remote controls, phones, and handles on your refrigerator and microwave. And don't forget countertops, tabletops, bathrooms, and computer keyboards. Take acetaminophen (Tylenol) to relieve fever and body aches. Read and follow all instructions on the label. Current as of: December 18, 2020               Content Version: 12.7  © 2006-2021 Healthwise, [employer].    Care instructions adapted under license by [employer] (which disclaims liability or warranty for this information). If you have questions about a medical condition or this instruction, always ask your healthcare professional. [place] [address] any warranty or liability for your use of this information. Patient Education        SaritaNoemí Barajasserenityeduardo Murguia After Treatment for COVID-19: Care Instructions  Overview     You are being sent home from the hospital after being treated for COVID-19. Being in the hospital can be hard, especially if you've been in the intensive care unit (ICU). Even though you're going home, you probably don't feel well yet. Healing from COVID-19 takes time. You may feel very tired for weeks or months afterward, especially if you were on a ventilator. It will take time to get back to your old level of activity. Some people may have long-lasting health problems. But most people can look forward to feeling a little better every day. If you were on a ventilator, your throat may be sore and your voice hoarse or raspy for a while. After leaving the hospital, some people have feelings of anxiety and depression. They may have nightmares. Or in their mind they may relive events that happened in the hospital (flashbacks). You can always reach out to your doctor if you're having trouble with these symptoms. Your doctor will tell you if you need to isolate yourself at home, and when you can end isolation. Follow-up care is a key part of your treatment and safety. Be sure to make and go to all appointments, and call your doctor if you are having problems. It's also a good idea to know your test results and keep a list of the medicines you take. How can you care for yourself at home? · Get plenty of rest. It can help you feel better. · Be kind to yourself if it's taking longer than you expected to feel better. You've been through a stressful time. · Get up and walk around every hour or two while you're resting.  Slowly increase your activity as you start to feel better. · Eat healthy foods. · Drink plenty of fluids. If you have kidney, heart, or liver disease and have to limit fluids, talk with your doctor before you increase the amount of fluids you drink. · Take acetaminophen (such as Tylenol) to reduce a fever. It may also help with muscle aches. Read and follow all instructions on the label. If you are in isolation after you get home  · Wear a cloth face cover when you are around other people. It can help stop the spread of the virus when you cough or sneeze. · Limit contact with people in your home. If possible, stay in a separate bedroom and use a separate bathroom. · If you have to leave home, avoid crowds and try to stay at least 6 feet away from other people. · Avoid contact with pets and other animals. · Cover your mouth and nose with a tissue when you cough or sneeze. Then throw it in the trash right away. · Wash your hands often, especially after you cough or sneeze. Use soap and water, and scrub for at least 20 seconds. If soap and water aren't available, use an alcohol-based hand . · Don't share personal household items. These include bedding, towels, cups and glasses, and eating utensils. · [address] in the warmest water allowed for the fabric type, and dry it completely. It's okay to wash other people's laundry with yours. · Clean and disinfect your home every day. Use household  and disinfectant wipes or sprays. Take special care to clean things that you grab with your hands. These include doorknobs, remote controls, phones, and handles on your refrigerator and microwave. And don't forget countertops, tabletops, bathrooms, and computer keyboards. When should you call for help? Call 911 anytime you think you may need emergency care. For example, call if you have life-threatening symptoms, such as:    · You have severe trouble breathing.  (You can't talk at all.)     · You have constant chest pain or pressure.     · You are severely dizzy or lightheaded.     · You are confused or can't think clearly.     · Your face and lips have a blue color.     · You passed out (lost consciousness) or are very hard to wake up. Call your doctor now or seek immediate medical care if:    · You have moderate trouble breathing. (You can't speak a full sentence.)     · You are coughing up blood (more than about 1 teaspoon).     · You have signs of low blood pressure. These include feeling lightheaded; being too weak to stand; and having cold, pale, clammy skin. Watch closely for changes in your health, and be sure to contact your doctor if:    · Your symptoms get worse.     · You are not getting better as expected.     · You have new or worse symptoms of anxiety, depression, nightmares, or flashbacks. Call before you go to the doctor's office. Follow their instructions. And wear a cloth face cover. Current as of: December 18, 2020               Content Version: 12.7  © 2006-2021 Healthwise, Inc[employer]. Care instructions adapted under license by [employer] (which disclaims liability or warranty for this information). If you have questions about a medical condition or this instruction, always ask your healthcare professional. [place] [address] any warranty or liability for your use of this information.

## 2021-03-19 ENCOUNTER — PATIENT OUTREACH (OUTPATIENT)
Dept: CASE MANAGEMENT | Age: 61
End: 2021-03-19

## 2021-03-19 NOTE — PROGRESS NOTES
Patient contacted regarding RCKYC-17 diagnosis\". Discussed COVID-19 related testing which was available at this time. Test results were positive. Patient informed of results, if available? yes -originally diagnosed 3/7/2021. Had a rapid test 3/18/2021 prior to discharge and virus is still detected. Care Transition Nurse contacted the patient by telephone to perform post discharge assessment. Call within 2 business days of discharge: Yes Verified name and  with patient as identifiers. Provided introduction to self, and explanation of the CTN/ACM role, and reason for call due to risk factors for infection and/or exposure to COVID-19. Symptoms reviewed with patient who verbalized the following symptoms: cough and shortness of breath      Due to no new or worsening symptoms encounter was not routed to provider for escalation. Discussed follow-up appointments. If no appointment was previously scheduled, appointment scheduling offered:  yes   [facility] follow up appointment(s): No future appointments. Patient stated that he was good to get appointment scheduled himself- did not want offered assistance. Non-[facility] follow up appointment(s): n/a     Advance Care Planning:   Does patient have an Advance Directive:  decision maker updated. Patient has following risk factors of: diabetes and HTN, and obesity. CTN reviewed discharge instructions, medical action plan and red flags such as increased shortness of breath, increasing fever and signs of decompensation with patient who verbalized understanding. Discussed exposure protocols and quarantine with CDC Guidelines What to do if you are sick with coronavirus disease 2019.  Patient was given an opportunity for questions and concerns. The patient agrees to contact the [facility] exposure line [phone], [facility] department [address]  ([phone] and PCP office for questions related to their healthcare.  CTN provided contact information for future needs. Reviewed and educated patient on any new and changed medications related to discharge diagnosis     Was patient discharged with a pulse oximeter? no Discussed and confirmed pulse oximeter discharge instructions and when to notify provider or seek emergency care. Patient/family/caregiver given information for [employer] and agrees to enroll yes  Patient's preferred e-mail: declined   Patient's preferred phone number: [phone]  Based on Loop alert triggers, patient will be contacted by nurse care manager for worsening symptoms. Pt will be further monitored by COVID Loop Team based on severity of symptoms and risk factors. Spoke with patient. He stated that he is doing ok this am. He was fixing some breakfast. He stated that his sister was on her way to get meds that were given at discharge. Patient has no questions about meds. Encouraged patient to wear a mask if he is in a common area of home. Also advised patient to wipe things down in home such as door knobs and faucets. Patient stated that he appreciated the call to check on him. Will follow.
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
## 2021-04-02 ENCOUNTER — PATIENT OUTREACH (OUTPATIENT)
Dept: CASE MANAGEMENT | Age: 61
End: 2021-04-02

## 2021-04-02 NOTE — PROGRESS NOTES
Patient resolved from [address] Transitions episode on 4/2/2021. Discussed COVID-19 related testing which was available at this time. Test results were positive. Patient informed of results, if available? yes     Patient/family has been provided the following resources and education related to COVID-19:                         Signs, symptoms and red flags related to COVID-19            [facility] exposure and quarantine guidelines            [facility] exposure contact - [phone]            Contact for their local Department of Health                 Patient currently reports that the following symptoms have improved:  no new symptoms and no worsening symptoms. No further outreach scheduled with this CTN/ACM/LPN/HC/ MA. Episode of Care resolved. Patient has this CTN/ACM/LPN/HC/MA contact information if future needs arise.

## 2021-08-19 ENCOUNTER — HOSPITAL ENCOUNTER (EMERGENCY)
Age: 61
Discharge: HOME | End: 2021-08-20
Attending: EMERGENCY MEDICINE
Payer: COMMERCIAL

## 2021-08-19 VITALS
OXYGEN SATURATION: 100 % | RESPIRATION RATE: 20 BRPM | TEMPERATURE: 97.7 F | WEIGHT: 212 LBS | SYSTOLIC BLOOD PRESSURE: 143 MMHG | DIASTOLIC BLOOD PRESSURE: 65 MMHG | BODY MASS INDEX: 30.35 KG/M2 | HEIGHT: 70 IN | HEART RATE: 75 BPM
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
## 2021-08-19 DIAGNOSIS — S00.81XA ABRASION, FACE W/O INFECTION: Primary | ICD-10-CM

## 2021-08-19 PROCEDURE — 99282 EMERGENCY DEPT VISIT SF MDM: CPT
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
## 2021-11-05 NOTE — PROGRESS NOTES
Pressure channel 1 zeroed. Problem: Falls - Risk of  Goal: *Absence of Falls  Description: Document Wil Greco Fall Risk and appropriate interventions in the flowsheet. Outcome: Progressing Towards Goal  Note: Fall Risk Interventions:            Medication Interventions: Patient to call before getting OOB, Teach patient to arise slowly                   Problem: Patient Education: Go to Patient Education Activity  Goal: Patient/Family Education  Outcome: Progressing Towards Goal     Problem: Pain  Goal: *Control of Pain  Outcome: Progressing Towards Goal     Problem: Patient Education: Go to Patient Education Activity  Goal: Patient/Family Education  Outcome: Progressing Towards Goal     Problem: Breathing Pattern - Ineffective  Goal: *Absence of hypoxia  Outcome: Progressing Towards Goal  Goal: *Use of effective breathing techniques  Outcome: Progressing Towards Goal     Problem: Patient Education: Go to Patient Education Activity  Goal: Patient/Family Education  Outcome: Progressing Towards Goal     Problem: Diabetes Self-Management  Goal: *Disease process and treatment process  Description: Define diabetes and identify own type of diabetes; list 3 options for treating diabetes. Outcome: Progressing Towards Goal  Goal: *Incorporating nutritional management into lifestyle  Description: Describe effect of type, amount and timing of food on blood glucose; list 3 methods for planning meals. Outcome: Progressing Towards Goal  Goal: *Incorporating physical activity into lifestyle  Description: State effect of exercise on blood glucose levels. Outcome: Progressing Towards Goal  Goal: *Developing strategies to promote health/change behavior  Description: Define the ABC's of diabetes; identify appropriate screenings, schedule and personal plan for screenings.   Outcome: Progressing Towards Goal  Goal: *Using medications safely  Description: State effect of diabetes medications on diabetes; name diabetes medication taking, action and side effects. Outcome: Progressing Towards Goal  Goal: *Monitoring blood glucose, interpreting and using results  Description: Identify recommended blood glucose targets  and personal targets. Outcome: Progressing Towards Goal  Goal: *Prevention, detection, treatment of acute complications  Description: List symptoms of hyper- and hypoglycemia; describe how to treat low blood sugar and actions for lowering  high blood glucose level. Outcome: Progressing Towards Goal  Goal: *Prevention, detection and treatment of chronic complications  Description: Define the natural course of diabetes and describe the relationship of blood glucose levels to long term complications of diabetes.   Outcome: Progressing Towards Goal  Goal: *Developing strategies to address psychosocial issues  Description: Describe feelings about living with diabetes; identify support needed and support network  Outcome: Progressing Towards Goal  Goal: *Insulin pump training  Outcome: Progressing Towards Goal  Goal: *Sick day guidelines  Outcome: Progressing Towards Goal  Goal: *Patient Specific Goal (EDIT GOAL, INSERT TEXT)  Outcome: Progressing Towards Goal     Problem: Patient Education: Go to Patient Education Activity  Goal: Patient/Family Education  Outcome: Progressing Towards Goal

## 2022-05-08 ENCOUNTER — APPOINTMENT (OUTPATIENT)
Dept: CT IMAGING | Age: 62
End: 2022-05-08
Attending: EMERGENCY MEDICINE
Payer: COMMERCIAL

## 2022-05-08 ENCOUNTER — HOSPITAL ENCOUNTER (EMERGENCY)
Age: 62
Discharge: HOME | End: 2022-05-08
Attending: EMERGENCY MEDICINE
Payer: COMMERCIAL

## 2022-05-08 VITALS
HEIGHT: 70 IN | RESPIRATION RATE: 22 BRPM | TEMPERATURE: 97.7 F | SYSTOLIC BLOOD PRESSURE: 129 MMHG | HEART RATE: 75 BPM | WEIGHT: 211 LBS | BODY MASS INDEX: 30.21 KG/M2 | OXYGEN SATURATION: 98 % | DIASTOLIC BLOOD PRESSURE: 73 MMHG

## 2022-05-08 DIAGNOSIS — R19.7 DIARRHEA, UNSPECIFIED TYPE: ICD-10-CM

## 2022-05-08 DIAGNOSIS — R10.30 LOWER ABDOMINAL PAIN: Primary | ICD-10-CM

## 2022-05-08 DIAGNOSIS — R11.2 NAUSEA AND VOMITING, UNSPECIFIED VOMITING TYPE: ICD-10-CM

## 2022-05-08 LAB
ALBUMIN SERPL-MCNC: 3.5 G/DL (ref 3.4–5)
ALBUMIN/GLOB SERPL: 1 {RATIO} (ref 0.8–1.7)
ALP SERPL-CCNC: 84 U/L (ref 45–117)
ALT SERPL-CCNC: 32 U/L (ref 16–61)
ANION GAP SERPL CALC-SCNC: 6 MMOL/L (ref 3–18)
AST SERPL-CCNC: 20 U/L (ref 10–38)
BASOPHILS # BLD: 0 K/UL (ref 0–0.1)
BASOPHILS NFR BLD: 0 % (ref 0–2)
BILIRUB SERPL-MCNC: 0.4 MG/DL (ref 0.2–1)
BUN SERPL-MCNC: 36 MG/DL (ref 7–18)
BUN/CREAT SERPL: 27 (ref 12–20)
CALCIUM SERPL-MCNC: 8.8 MG/DL (ref 8.5–10.1)
CHLORIDE SERPL-SCNC: 113 MMOL/L (ref 100–111)
CO2 SERPL-SCNC: 20 MMOL/L (ref 21–32)
CREAT SERPL-MCNC: 1.31 MG/DL (ref 0.6–1.3)
DIFFERENTIAL METHOD BLD: ABNORMAL
EOSINOPHIL # BLD: 0.2 K/UL (ref 0–0.4)
EOSINOPHIL NFR BLD: 3 % (ref 0–5)
ERYTHROCYTE [DISTWIDTH] IN BLOOD BY AUTOMATED COUNT: 12 % (ref 11.6–14.5)
GLOBULIN SER CALC-MCNC: 3.4 G/DL (ref 2–4)
GLUCOSE BLD STRIP.AUTO-MCNC: 154 MG/DL (ref 70–110)
GLUCOSE SERPL-MCNC: 159 MG/DL (ref 74–99)
HCT VFR BLD AUTO: 40.7 % (ref 36–48)
HGB BLD-MCNC: 13.9 G/DL (ref 13–16)
IMM GRANULOCYTES # BLD AUTO: 0 K/UL (ref 0–0.04)
IMM GRANULOCYTES NFR BLD AUTO: 0 % (ref 0–0.5)
LIPASE SERPL-CCNC: 75 U/L (ref 73–393)
LYMPHOCYTES # BLD: 0.5 K/UL (ref 0.9–3.6)
LYMPHOCYTES NFR BLD: 7 % (ref 21–52)
MCH RBC QN AUTO: 32.8 PG (ref 24–34)
MCHC RBC AUTO-ENTMCNC: 34.2 G/DL (ref 31–37)
MCV RBC AUTO: 96 FL (ref 78–100)
MONOCYTES # BLD: 0.2 K/UL (ref 0.05–1.2)
MONOCYTES NFR BLD: 3 % (ref 3–10)
NEUTS SEG # BLD: 5.9 K/UL (ref 1.8–8)
NEUTS SEG NFR BLD: 87 % (ref 40–73)
NRBC # BLD: 0 K/UL (ref 0–0.01)
NRBC BLD-RTO: 0 PER 100 WBC
PLATELET # BLD AUTO: 187 K/UL (ref 135–420)
PMV BLD AUTO: 10.3 FL (ref 9.2–11.8)
POTASSIUM SERPL-SCNC: 4.1 MMOL/L (ref 3.5–5.5)
PROT SERPL-MCNC: 6.9 G/DL (ref 6.4–8.2)
RBC # BLD AUTO: 4.24 M/UL (ref 4.35–5.65)
SODIUM SERPL-SCNC: 139 MMOL/L (ref 136–145)
WBC # BLD AUTO: 6.8 K/UL (ref 4.6–13.2)

## 2022-05-08 PROCEDURE — 80053 COMPREHEN METABOLIC PANEL: CPT

## 2022-05-08 PROCEDURE — 99285 EMERGENCY DEPT VISIT HI MDM: CPT

## 2022-05-08 PROCEDURE — 74011000636 HC RX REV CODE- 636: Performed by: EMERGENCY MEDICINE

## 2022-05-08 PROCEDURE — 85025 COMPLETE CBC W/AUTO DIFF WBC: CPT

## 2022-05-08 PROCEDURE — 74011250636 HC RX REV CODE- 250/636: Performed by: EMERGENCY MEDICINE

## 2022-05-08 PROCEDURE — 83690 ASSAY OF LIPASE: CPT

## 2022-05-08 PROCEDURE — 74177 CT ABD & PELVIS W/CONTRAST: CPT

## 2022-05-08 PROCEDURE — 96374 THER/PROPH/DIAG INJ IV PUSH: CPT

## 2022-05-08 PROCEDURE — 74011250637 HC RX REV CODE- 250/637: Performed by: EMERGENCY MEDICINE

## 2022-05-08 PROCEDURE — 82962 GLUCOSE BLOOD TEST: CPT

## 2022-05-08 RX ORDER — DICYCLOMINE HYDROCHLORIDE 10 MG/1
20 CAPSULE ORAL
Status: COMPLETED | OUTPATIENT
Start: 2022-05-08 | End: 2022-05-08

## 2022-05-08 RX ORDER — ONDANSETRON 2 MG/ML
4 INJECTION INTRAMUSCULAR; INTRAVENOUS ONCE
Status: COMPLETED | OUTPATIENT
Start: 2022-05-08 | End: 2022-05-08

## 2022-05-08 RX ORDER — ONDANSETRON 4 MG/1
4 TABLET, ORALLY DISINTEGRATING ORAL
Qty: 20 TABLET | Refills: 0 | Status: SHIPPED | OUTPATIENT
Start: 2022-05-08

## 2022-05-08 RX ORDER — DICYCLOMINE HYDROCHLORIDE 20 MG/1
20 TABLET ORAL EVERY 6 HOURS
Qty: 12 TABLET | Refills: 0 | Status: SHIPPED | OUTPATIENT
Start: 2022-05-08

## 2022-05-08 RX ADMIN — IOPAMIDOL 100 ML: 612 INJECTION, SOLUTION INTRAVENOUS at 11:36

## 2022-05-08 RX ADMIN — DICYCLOMINE HYDROCHLORIDE 20 MG: 10 CAPSULE ORAL at 10:37

## 2022-05-08 RX ADMIN — ONDANSETRON 4 MG: 2 INJECTION INTRAMUSCULAR; INTRAVENOUS at 10:37

## 2022-05-08 RX ADMIN — SODIUM CHLORIDE 1000 ML: 9 INJECTION, SOLUTION INTRAVENOUS at 10:37

## 2022-05-08 NOTE — ED TRIAGE NOTES
Pt arrive to ed ambulatory with c/o lower abdominal pain and diarrhea since Friday. Pt have PMHX of crohn's disease and diabetic.

## 2022-05-08 NOTE — ED PROVIDER NOTES
EMERGENCY DEPARTMENT HISTORY AND PHYSICAL EXAM    Date: 5/8/2022  Patient Name: Shannan Jose. History of Presenting Illness     Chief Complaint   Patient presents with    Diarrhea    Abdominal Pain       History Provided By: Patient     History Lisa Jin):   9:39 AM  Shannan Aviles is a 65 y.o. male with PMHX of Diabetes, hypertension, hyperlipidemia, Hashimoto's who presents to the emergency department C/O abdominal pain onset 2 days ago. Associated sxs include nausea, diarrhea. Pt denies any other sxs or complaints. Patient states a history of Crohn's disease as well. Chief Complaint: abdominal pain  Onset: 2 days   Timing:  Acute  Context: Symptoms started spontaneously, symptoms have progressively worsened since onset  Location: Lower abdomen  Quality: Sharp  Severity: Moderate  Modifying Factors: Nothing makes it better, or worse. Associated Symptoms: nausea, diarrhea    PCP: Kendall Thomas MD     Past History         Past Medical History:  Past Medical History:   Diagnosis Date    Diabetes ([facility] [address]     Hashimoto's disease     High cholesterol     Hypertension     Obesity (BMI 30-39.9) 3/13/2021       Past Surgical History:  Past Surgical History:   Procedure Laterality Date    HX KNEE ARTHROSCOPY         Family History:  Family History   Problem Relation Age of Onset    Diabetes Mother     Heart Disease Mother     Diabetes Father     Diabetes Sister     Diabetes Brother     Heart Disease Maternal Grandfather    Reviewed and non-contributory    Social History:  Social History     Tobacco Use    Smoking status: Never Smoker    Smokeless tobacco: Never Used   Substance Use Topics    Alcohol use: No    Drug use: Not on file       Medications:  Current Outpatient Medications   Medication Sig Dispense Refill    ondansetron (ZOFRAN ODT) 4 mg disintegrating tablet Take 1 Tablet by mouth every eight (8) hours as needed for Nausea or Vomiting.  20 Tablet 0    dicyclomine (BENTYL) 20 mg tablet Take 1 Tablet by mouth every six (6) hours. 12 Tablet 0    insulin lispro (HUMALOG) 100 unit/mL injection For Blood Sugar (mg/dL) of:              Less than 150 =   0 units  150 -199 =   3 units  200 -249 =   6 units  250 -299 =   9 units  300 -349 =   12 units  350 and above =   15 units  Initiate Hypoglycemic protocol if blood glucose is <70 mg/dL. IN CORRECTIVE PROTOCOL: Normal Insulin Sensitivity   For Blood Sugar (mg/dL) of:     Less than 150 =   0 units           150 -199 =   2 units  200 -249 =   4 units  250 -299 =   6 units  300 -349 =   8 units  350 and above = 10 units and Call Physician 1 Vial 0    albuterol (PROVENTIL HFA, VENTOLIN HFA, PROAIR HFA) 90 mcg/actuation inhaler Take 2 Puffs by inhalation every four (4) hours as needed for Wheezing. 1 Inhaler 0    insulin detemir (LEVEMIR) 100 unit/mL injection by SubCUTAneous route nightly.  levothyroxine (SYNTHROID) 50 mcg tablet Take  by mouth Daily (before breakfast).  lisinopril (PRINIVIL, ZESTRIL) 5 mg tablet Take 5 mg by mouth daily.  simvastatin (ZOCOR) 20 mg tablet Take  by mouth nightly.  cyclobenzaprine (FLEXERIL) 5 mg tablet Take 1 Tab by mouth three (3) times daily as needed for Muscle Spasm(s). 15 Tab 0       Allergies: Allergies   Allergen Reactions    Glipizide Other (comments)     Kidney problems      Metformin Other (comments)     Kidney problems           Review of Systems      Review of Systems   Constitutional: Negative for chills and fever. HENT: Negative for rhinorrhea and sore throat. Eyes: Negative for pain and visual disturbance. Respiratory: Negative for chest tightness, shortness of breath and wheezing. Cardiovascular: Negative for chest pain and palpitations. Gastrointestinal: Positive for abdominal pain, diarrhea and nausea. Negative for vomiting. Musculoskeletal: Negative for arthralgias and myalgias. Skin: Negative for rash and wound.    Neurological: Negative for speech difficulty, light-headedness and headaches. Psychiatric/Behavioral: Negative for agitation and confusion. All other systems reviewed and are negative. Physical Exam     Vitals:    05/08/22 1040 05/08/22 1120 05/08/22 1210 05/08/22 1230   BP:  120/60 115/62 129/73   Pulse: 79 69 70 75   Resp: 21 22 23 22   Temp:       SpO2: 97% 99% 97% 98%   Weight:       Height:           Physical Exam  Vitals and nursing note reviewed. Constitutional:       General: He is not in acute distress. Appearance: Normal appearance. He is normal weight. He is not ill-appearing. HENT:      Head: Normocephalic and atraumatic. Nose: Nose normal. No rhinorrhea. Mouth/Throat:      Mouth: Mucous membranes are moist.      Pharynx: No oropharyngeal exudate or posterior oropharyngeal erythema. Eyes:      Extraocular Movements: Extraocular movements intact. Conjunctiva/sclera: Conjunctivae normal.      Pupils: Pupils are equal, round, and reactive to light. Cardiovascular:      Rate and Rhythm: Normal rate and regular rhythm. Heart sounds: No murmur heard. No friction rub. No gallop. Pulmonary:      Effort: Pulmonary effort is normal. No respiratory distress. Breath sounds: Normal breath sounds. No wheezing, rhonchi or rales. Abdominal:      General: Bowel sounds are normal.      Palpations: Abdomen is soft. Tenderness: There is abdominal tenderness in the right lower quadrant, periumbilical area, suprapubic area and left lower quadrant. There is no right CVA tenderness, left CVA tenderness, guarding or rebound. Negative signs include Heller's sign, Rovsing's sign and McBurney's sign. Musculoskeletal:         General: No swelling, tenderness or deformity. Normal range of motion. Cervical back: Normal range of motion and neck supple. No rigidity. Lymphadenopathy:      Cervical: No cervical adenopathy. Skin:     General: Skin is warm and dry. Findings: No rash.    Neurological: General: No focal deficit present. Mental Status: He is alert and oriented to person, place, and time. Psychiatric:         Mood and Affect: Mood normal.         Behavior: Behavior normal.         Diagnostic Study Results     Labs -  Recent Results (from the past 12 hour(s))   GLUCOSE, POC    Collection Time: 05/08/22  9:43 AM   Result Value Ref Range    Glucose (POC) 154 (H) 70 - 110 mg/dL   CBC WITH AUTOMATED DIFF    Collection Time: 05/08/22  9:45 AM   Result Value Ref Range    WBC 6.8 4.6 - 13.2 K/uL    RBC 4.24 (L) 4.35 - 5.65 M/uL    HGB 13.9 13.0 - 16.0 g/dL    HCT 40.7 36.0 - 48.0 %    MCV 96.0 78.0 - 100.0 FL    MCH 32.8 24.0 - 34.0 PG    MCHC 34.2 31.0 - 37.0 g/dL    RDW 12.0 11.6 - 14.5 %    PLATELET 787 719 - 867 K/uL    MPV 10.3 9.2 - 11.8 FL    NRBC 0.0 0  WBC    ABSOLUTE NRBC 0.00 0.00 - 0.01 K/uL    NEUTROPHILS 87 (H) 40 - 73 %    LYMPHOCYTES 7 (L) 21 - 52 %    MONOCYTES 3 3 - 10 %    EOSINOPHILS 3 0 - 5 %    BASOPHILS 0 0 - 2 %    IMMATURE GRANULOCYTES 0 0.0 - 0.5 %    ABS. NEUTROPHILS 5.9 1.8 - 8.0 K/UL    ABS. LYMPHOCYTES 0.5 (L) 0.9 - 3.6 K/UL    ABS. MONOCYTES 0.2 0.05 - 1.2 K/UL    ABS. EOSINOPHILS 0.2 0.0 - 0.4 K/UL    ABS. BASOPHILS 0.0 0.0 - 0.1 K/UL    ABS. IMM. GRANS. 0.0 0.00 - 0.04 K/UL    DF AUTOMATED     METABOLIC PANEL, COMPREHENSIVE    Collection Time: 05/08/22  9:45 AM   Result Value Ref Range    Sodium 139 136 - 145 mmol/L    Potassium 4.1 3.5 - 5.5 mmol/L    Chloride 113 (H) 100 - 111 mmol/L    CO2 20 (L) 21 - 32 mmol/L    Anion gap 6 3.0 - 18 mmol/L    Glucose 159 (H) 74 - 99 mg/dL    BUN 36 (H) 7.0 - 18 MG/DL    Creatinine 1.31 (H) 0.6 - 1.3 MG/DL    BUN/Creatinine ratio 27 (H) 12 - 20      GFR est AA >60 >60 ml/min/1.73m2    GFR est non-AA 56 (L) >60 ml/min/1.73m2    Calcium 8.8 8.5 - 10.1 MG/DL    Bilirubin, total 0.4 0.2 - 1.0 MG/DL    ALT (SGPT) 32 16 - 61 U/L    AST (SGOT) 20 10 - 38 U/L    Alk.  phosphatase 84 45 - 117 U/L    Protein, total 6.9 6.4 - 8.2 g/dL    Albumin 3.5 3.4 - 5.0 g/dL    Globulin 3.4 2.0 - 4.0 g/dL    A-G Ratio 1.0 0.8 - 1.7     LIPASE    Collection Time: 05/08/22  9:45 AM   Result Value Ref Range    Lipase 75 73 - 393 U/L       Radiologic Studies -   CT ABD PELV W CONT   Final Result      1. Mild small bowel wall thickening may be due to underdistention versus   enteritis which is most commonly infectious or inflammatory etiology. 2.  No evidence of bowel obstruction. Normal appendix. 3.  Skin thickening and stranding in the periumbilical soft tissues, correlate   with physical exam to exclude cellulitis. 4.  Additional findings as detailed in the body of the report. CT Results  (Last 48 hours)               05/08/22 1145  CT ABD PELV W CONT Final result    Impression:      1. Mild small bowel wall thickening may be due to underdistention versus   enteritis which is most commonly infectious or inflammatory etiology. 2.  No evidence of bowel obstruction. Normal appendix. 3.  Skin thickening and stranding in the periumbilical soft tissues, correlate   with physical exam to exclude cellulitis. 4.  Additional findings as detailed in the body of the report. Narrative:  EXAM: CT ABD PELV W CONT       CLINICAL INDICATION/HISTORY: lower abdominal pain, n//v/d, crohn's. Watery   stools and lower abdominal pain, history of diabetes and hypertension. COMPARISON: None. TECHNIQUE:   CT of the abdomen and pelvis following intravenous contrast   administration. Coronal and sagittal reformats were generated and reviewed. One or more dose reduction techniques were used on this CT: automated exposure   control, adjustment of the mAs and/or kVp according to patient size, and   iterative reconstruction techniques. The specific techniques used on this CT   exam have been documented in the patient's electronic medical record.   Digital   Imaging and Communications in Medicine (DICOM) format image data are available   to nonaffiliated external healthcare facilities or entities on a secure, media   free, reciprocally searchable basis with patient authorization for at least a   12-month period after this study. _______________       FINDINGS:       LOWER THORAX: No global cardiomegaly or pericardial effusion. Bilateral   dependent lung opacities favored to represent atelectasis. No pleural effusion. LIVER: Borderline enlarged (18.5 cm). No enhancing hepatic mass. The portal and   hepatic veins are patent. BILIARY: Gallbladder is contracted. No biliary dilation. SPLEEN: Normal.       PANCREAS: Normal.       ADRENALS: Normal.       KIDNEYS: Normal.       GI TRACT:  Partially distended stomach is grossly normal. Normal caliber small   and large bowel loops. No morphology of bowel obstruction. Mild small bowel   wall thickening. Normal appendix. BLADDER: Normal.        PELVIC ORGANS: Heterogeneous borderline enlarged prostate. VASCULATURE: No arterial aneurysm. Mild atherosclerosis. LYMPH NODES: No mesenteric or retroperitoneal lymphadenopathy. OTHER: No free intraperitoneal air. No ascites. Skin thickening and stranding in   the periumbilical soft tissues (axial image 75, sagittal image 77). OSSEOUS STRUCTURES: No acute osseous abnormality.  Mild degenerative changes   throughout the spine and both hips.       _______________               CXR Results  (Last 48 hours)    None          Medications given in the ED-  Medications   sodium chloride 0.9 % bolus infusion 1,000 mL (0 mL IntraVENous IV Completed 5/8/22 1137)   ondansetron (ZOFRAN) injection 4 mg (4 mg IntraVENous Given 5/8/22 1037)   dicyclomine (BENTYL) capsule 20 mg (20 mg Oral Given 5/8/22 1037)   iopamidoL (ISOVUE 300) 61 % contrast injection 100 mL (100 mL IntraVENous Given 5/8/22 1136)       Procedures     Procedures    ED Course     I am the first provider for this patient. I reviewed the vital signs, available nursing notes, past medical history, past surgical history, family history and social history. Records Reviewed: Nursing Notes    Cardiac Monitor:  Rate: 85 bpm  Rhythm: sinus rhythm    Pulse Oximetry Analysis - 96% on RA    9:39 AM Initial assessment performed. The patients presenting problems have been discussed, and they are in agreement with the care plan formulated and outlined with them. I have encouraged them to ask questions as they arise throughout their visit. Medical Decision Making     Provider Notes (Medical Decision Making):   DDX: Appendicitis, Crohn's flare, enteritis, viral illness    Discussion:  65 y.o. male abdominal pain, nausea, vomiting, diarrhea CT scan showed some mild enteritis but no sign of Crohn's flare or appendicitis. No sign of obstruction or bowel perforation. No indication for starting home antibiotics. Keep patient on Zofran and Bentyl. Patient may follow-up with his primary care doctor. Work note provided at patient request.    Diagnosis and Disposition     DISCHARGE NOTE:  12:52 PM   Edd Swati Noemí's  results have been reviewed with him. He has been counseled regarding his diagnosis, treatment, and plan. He verbally conveys understanding and agreement of the signs, symptoms, diagnosis, treatment and prognosis and additionally agrees to follow up as discussed. He also agrees with the care-plan and conveys that all of his questions have been answered. I have also provided discharge instructions for him that include: educational information regarding their diagnosis and treatment, and list of reasons why they would want to return to the ED prior to their follow-up appointment, should his condition change. He has been provided with education for proper emergency department utilization. CLINICAL IMPRESSION:    1. Lower abdominal pain    2. Nausea and vomiting, unspecified vomiting type    3. Diarrhea, unspecified type        PLAN:  1. D/C Home  2. Current Discharge Medication List      START taking these medications    Details   ondansetron (ZOFRAN ODT) 4 mg disintegrating tablet Take 1 Tablet by mouth every eight (8) hours as needed for Nausea or Vomiting. Qty: 20 Tablet, Refills: 0  Start date: 5/8/2022      dicyclomine (BENTYL) 20 mg tablet Take 1 Tablet by mouth every six (6) hours. Qty: 12 Tablet, Refills: 0  Start date: 5/8/2022           3. Follow-up Information     Follow up With Specialties Details Why [address]  Schedule an appointment as soon as possible for a visit  As soon as possible, For follow up from Emergency Department visit. [facility] [address] [facility]  Schedule an appointment as soon as possible for a visit  As soon as possible, For follow up from Emergency Department visit. [facility] [address]  Schedule an appointment as soon as possible for a visit  As soon as possible, For follow up from Emergency Department visit. [facility] [id number]  [phone]    [facility] EMERGENCY DEPT Emergency Medicine   [address] [address] [place]     Please note that this dictation was completed with [employer], the computer voice recognition software. Quite often unanticipated grammatical, syntax, homophones, and other interpretive errors are inadvertently transcribed by the computer software. Please disregard these errors. Please excuse any errors that have escaped final proofreading.     Diomedes Zaragoza MD

## 2022-05-08 NOTE — LETTER
[facility] [place]  [facility]  [address]  [facility] [id number] [phone]    Work/School Note    Date: 5/8/2022    To Whom It May concern:    Talon Elliott. was seen and treated today in the emergency room by the following provider(s):  Attending Provider: Asha Quiroz, [address] [facility] [place]. may return to work on 11 May 2022.     Sincerely,          Gill Chicas MD

## 2022-05-08 NOTE — ED NOTES
Pt endorses watery stools and lower abdominal pain with movement (bending over, sitting up). Pt denies blood in stool, denies blood in urine, denies vomiting. Pt is speaking in complete clear sentences.  Pt appears to be in NAD